# Patient Record
Sex: FEMALE | Race: WHITE | NOT HISPANIC OR LATINO
[De-identification: names, ages, dates, MRNs, and addresses within clinical notes are randomized per-mention and may not be internally consistent; named-entity substitution may affect disease eponyms.]

---

## 2020-02-03 ENCOUNTER — RESULT REVIEW (OUTPATIENT)
Age: 40
End: 2020-02-03

## 2020-02-13 ENCOUNTER — APPOINTMENT (OUTPATIENT)
Dept: ORTHOPEDIC SURGERY | Facility: CLINIC | Age: 40
End: 2020-02-13
Payer: COMMERCIAL

## 2020-02-13 VITALS
SYSTOLIC BLOOD PRESSURE: 110 MMHG | HEIGHT: 65 IN | BODY MASS INDEX: 34.66 KG/M2 | DIASTOLIC BLOOD PRESSURE: 75 MMHG | WEIGHT: 208 LBS | HEART RATE: 84 BPM

## 2020-02-13 DIAGNOSIS — M25.551 PAIN IN RIGHT HIP: ICD-10-CM

## 2020-02-13 DIAGNOSIS — Z78.9 OTHER SPECIFIED HEALTH STATUS: ICD-10-CM

## 2020-02-13 DIAGNOSIS — S33.5XXA SPRAIN OF LIGAMENTS OF LUMBAR SPINE, INITIAL ENCOUNTER: ICD-10-CM

## 2020-02-13 DIAGNOSIS — Z80.1 FAMILY HISTORY OF MALIGNANT NEOPLASM OF TRACHEA, BRONCHUS AND LUNG: ICD-10-CM

## 2020-02-13 PROCEDURE — 99204 OFFICE O/P NEW MOD 45 MIN: CPT

## 2020-02-13 PROCEDURE — 72100 X-RAY EXAM L-S SPINE 2/3 VWS: CPT

## 2020-02-13 PROCEDURE — 73502 X-RAY EXAM HIP UNI 2-3 VIEWS: CPT | Mod: RT

## 2020-03-16 ENCOUNTER — APPOINTMENT (OUTPATIENT)
Dept: ORTHOPEDIC SURGERY | Facility: CLINIC | Age: 40
End: 2020-03-16

## 2021-06-18 ENCOUNTER — TRANSCRIPTION ENCOUNTER (OUTPATIENT)
Age: 41
End: 2021-06-18

## 2021-10-05 ENCOUNTER — APPOINTMENT (OUTPATIENT)
Dept: BARIATRICS | Facility: CLINIC | Age: 41
End: 2021-10-05
Payer: COMMERCIAL

## 2021-10-05 VITALS
HEART RATE: 97 BPM | WEIGHT: 235 LBS | SYSTOLIC BLOOD PRESSURE: 110 MMHG | OXYGEN SATURATION: 99 % | HEIGHT: 63 IN | BODY MASS INDEX: 41.64 KG/M2 | DIASTOLIC BLOOD PRESSURE: 70 MMHG

## 2021-10-05 DIAGNOSIS — Z13.29 ENCOUNTER FOR SCREENING FOR OTHER SUSPECTED ENDOCRINE DISORDER: ICD-10-CM

## 2021-10-05 DIAGNOSIS — R06.02 SHORTNESS OF BREATH: ICD-10-CM

## 2021-10-05 DIAGNOSIS — Z13.228 ENCOUNTER FOR SCREENING FOR OTHER SUSPECTED ENDOCRINE DISORDER: ICD-10-CM

## 2021-10-05 DIAGNOSIS — Z13.0 ENCOUNTER FOR SCREENING FOR OTHER SUSPECTED ENDOCRINE DISORDER: ICD-10-CM

## 2021-10-05 DIAGNOSIS — M62.89 OTHER SPECIFIED DISORDERS OF MUSCLE: ICD-10-CM

## 2021-10-05 DIAGNOSIS — Z83.3 FAMILY HISTORY OF DIABETES MELLITUS: ICD-10-CM

## 2021-10-05 DIAGNOSIS — Z13.0 ENCOUNTER FOR SCREENING FOR DISEASES OF THE BLOOD AND BLOOD-FORMING ORGANS AND CERTAIN DISORDERS INVOLVING THE IMMUNE MECHANISM: ICD-10-CM

## 2021-10-05 PROCEDURE — 99205 OFFICE O/P NEW HI 60 MIN: CPT

## 2021-10-05 NOTE — REVIEW OF SYSTEMS
[Recent Change In Weight] : ~T recent weight change [Shortness Of Breath] : shortness of breath [Joint Stiffness] : joint stiffness [Negative] : Allergic/Immunologic

## 2021-10-11 LAB
25(OH)D3 SERPL-MCNC: 34.5 NG/ML
ALBUMIN SERPL ELPH-MCNC: 4.1 G/DL
ALP BLD-CCNC: 74 U/L
ALT SERPL-CCNC: 12 U/L
ANION GAP SERPL CALC-SCNC: 13 MMOL/L
AST SERPL-CCNC: 13 U/L
BASOPHILS # BLD AUTO: 0.03 K/UL
BASOPHILS NFR BLD AUTO: 0.5 %
BILIRUB SERPL-MCNC: 0.8 MG/DL
BUN SERPL-MCNC: 14 MG/DL
CALCIUM SERPL-MCNC: 8.9 MG/DL
CHLORIDE SERPL-SCNC: 106 MMOL/L
CHOLEST SERPL-MCNC: 142 MG/DL
CO2 SERPL-SCNC: 21 MMOL/L
CREAT SERPL-MCNC: 0.71 MG/DL
EOSINOPHIL # BLD AUTO: 0.07 K/UL
EOSINOPHIL NFR BLD AUTO: 1.1 %
ESTIMATED AVERAGE GLUCOSE: 100 MG/DL
FERRITIN SERPL-MCNC: 115 NG/ML
FOLATE SERPL-MCNC: 16.5 NG/ML
GLUCOSE SERPL-MCNC: 91 MG/DL
HBA1C MFR BLD HPLC: 5.1 %
HCT VFR BLD CALC: 43.2 %
HDLC SERPL-MCNC: 52 MG/DL
HGB BLD-MCNC: 14.6 G/DL
IMM GRANULOCYTES NFR BLD AUTO: 0.2 %
INR PPP: 1.12 RATIO
IRON SATN MFR SERPL: 44 %
IRON SERPL-MCNC: 122 UG/DL
LDLC SERPL CALC-MCNC: 77 MG/DL
LYMPHOCYTES # BLD AUTO: 2.33 K/UL
LYMPHOCYTES NFR BLD AUTO: 35.9 %
MAN DIFF?: NORMAL
MCHC RBC-ENTMCNC: 29.6 PG
MCHC RBC-ENTMCNC: 33.8 GM/DL
MCV RBC AUTO: 87.6 FL
MONOCYTES # BLD AUTO: 0.58 K/UL
MONOCYTES NFR BLD AUTO: 8.9 %
NEUTROPHILS # BLD AUTO: 3.47 K/UL
NEUTROPHILS NFR BLD AUTO: 53.4 %
NONHDLC SERPL-MCNC: 90 MG/DL
PLATELET # BLD AUTO: 260 K/UL
POTASSIUM SERPL-SCNC: 4.3 MMOL/L
PROT SERPL-MCNC: 6.7 G/DL
PT BLD: 13.2 SEC
RBC # BLD: 4.93 M/UL
RBC # FLD: 11.9 %
SODIUM SERPL-SCNC: 140 MMOL/L
TIBC SERPL-MCNC: 278 UG/DL
TRIGL SERPL-MCNC: 64 MG/DL
TSH SERPL-ACNC: 1.19 UIU/ML
UIBC SERPL-MCNC: 156 UG/DL
VIT B12 SERPL-MCNC: 588 PG/ML
WBC # FLD AUTO: 6.49 K/UL

## 2021-10-19 NOTE — CONSULT LETTER
[Consult Letter:] : I had the pleasure of evaluating your patient, [unfilled]. [Please see my note below.] : Please see my note below. [Consult Closing:] : Thank you very much for allowing me to participate in the care of this patient.  If you have any questions, please do not hesitate to contact me. [Sincerely,] : Sincerely, [FreeTextEntry3] : Breana Coronel MD, FACS

## 2021-10-19 NOTE — ASSESSMENT
[FreeTextEntry1] : 41 year old woman with long-standing history of morbid obesity presents today to discuss options for weight loss surgery.  I had an extensive discussion with the patient reviewing the Laparoscopic Sleeve Gastrectomy. Diagrams were used. All questions were answered.  \par \par Complications were discussed including but not limited to: vitamin and protein deficiencies, pneumonia, urinary infection, wound infection, leaks/peritonitis possibly requiring intraabdominal drains or reoperation, bleeding, DVT, pulmonary embolus, severe reflux, sleeve obstruction, abdominal wall hernias, revisions, death, inadequate weight loss. The importance of vitamins and protein supplementation was stressed, as was the importance of follow-up and exercise. \par \par Patient encouraged to make dietary and lifestyle changes in preparation for surgery.\par \par Patient with a long history of morbid obesity.She is interested in the Laparoscopic Sleeve Gastrectomy. She was given written material to review.  Pre-operative evaluations were reviewed. She will need to lose weight prior to surgery and will be seen again prior to surgery.She was told to call with any questions.

## 2021-10-19 NOTE — HISTORY OF PRESENT ILLNESS
[de-identified] : 41 year old woman with a long-standing history of morbid obesity, who has attempted numerous weight loss treatments without long term success. Patient is familiar with the Laparoscopic Adjustable Gastric Band, the Laparoscopic Sleeve Gastrectomy and the Laparoscopic Gastric Bypass. Patient presents today to discuss options for surgery, specifically the Laparoscopic Sleeve Gastrectomy.

## 2021-11-08 DIAGNOSIS — Z01.818 ENCOUNTER FOR OTHER PREPROCEDURAL EXAMINATION: ICD-10-CM

## 2021-11-12 LAB — SARS-COV-2 N GENE NPH QL NAA+PROBE: NOT DETECTED

## 2021-11-15 ENCOUNTER — APPOINTMENT (OUTPATIENT)
Dept: PULMONOLOGY | Facility: CLINIC | Age: 41
End: 2021-11-15
Payer: COMMERCIAL

## 2021-11-15 VITALS
HEART RATE: 79 BPM | OXYGEN SATURATION: 97 % | WEIGHT: 238 LBS | TEMPERATURE: 97 F | RESPIRATION RATE: 15 BRPM | HEIGHT: 64 IN | SYSTOLIC BLOOD PRESSURE: 125 MMHG | DIASTOLIC BLOOD PRESSURE: 84 MMHG | BODY MASS INDEX: 40.63 KG/M2

## 2021-11-15 VITALS — WEIGHT: 236 LBS | TEMPERATURE: 98.3 F | BODY MASS INDEX: 40.29 KG/M2 | HEART RATE: 90 BPM | HEIGHT: 64 IN

## 2021-11-15 PROCEDURE — ZZZZZ: CPT

## 2021-11-15 PROCEDURE — 99205 OFFICE O/P NEW HI 60 MIN: CPT | Mod: 25

## 2021-11-15 PROCEDURE — 94726 PLETHYSMOGRAPHY LUNG VOLUMES: CPT

## 2021-11-15 PROCEDURE — 94010 BREATHING CAPACITY TEST: CPT

## 2021-11-15 PROCEDURE — 94729 DIFFUSING CAPACITY: CPT

## 2021-11-15 NOTE — ASSESSMENT
[FreeTextEntry1] : 40 y/o F with PMH of presented to the clinic for an initial evaluation. She is in the process of undergoing preoperative bariatric surgery evaluation.  From a pulmonary standpoint, she is doing well.  Does not have significant dyspnea of ALBERTO.  She was recently prescribed a rescue inhaler in the Spring for respiratory symptoms but those have subsided and she does not use her inhaler.  No limitation from an exercise stand point.  Denies cough.  No history of asthma. On exam, she is saturating 99% on room air with no e/o desaturation with exertion.  Her PFTs are witin normal limits. Will obtain a CXR.  From a sleep standpoint, she is doing well.  Has a child at home who is 5 years old so insufficient sleep time is an issue on occasion.  She snores but otherwise denies frequent awakenings unless it is related to her child waking up.  Feels mostly refreshed in the AM and minimal EDS with an ESS of 2 on today's visit.  On exam, her oropharynx is crowded due to enlarged base of tongue and low lying soft palate -- all of which increase risk of LACI.   The patient was referred to the [facility] for diagnostic polysomnography for further assessment. The ramifications of obstructive sleep apnea and its potential therapeutic modalities were discussed with the patient. The dangers of drowsy driving were discussed with the patient.  The patient was warned to avoid drowsy driving. The patient will followup after results of this study are available.\par  \par Jose Bowser, DO\par Pulmonary, Critical Care and Sleep Medicine Attending

## 2021-11-15 NOTE — PHYSICAL EXAM
[No Acute Distress] : no acute distress [Low Lying Soft Palate] : low lying soft palate [II] : Mallampati Class: II [Normal Appearance] : normal appearance [Normal Rate/Rhythm] : normal rate/rhythm [Normal S1, S2] : normal s1, s2 [No Murmurs] : no murmurs [No Resp Distress] : no resp distress [Clear to Auscultation Bilaterally] : clear to auscultation bilaterally [Benign] : benign [Normal Gait] : normal gait [No Edema] : no edema [Oriented x3] : oriented x3 [Normal Affect] : normal affect

## 2021-11-15 NOTE — HISTORY OF PRESENT ILLNESS
[TextBox_4] : 42 y/o F with no significant PMH presented to the clinic for an initial evaluation.\par \par SHe is in the process of undergoing preoperative bariatric surgery evaluation.  \par \par From a pulmonary standpoint, she is doing well.  Does not have significant dyspnea of ALBERTO.  She was recently prescribed a rescue inhaler in the Spring for respiratory symptoms but those have subsided and she does not use her inhaler.  No limitation from an exercise stand point.  Denies cough.  No history of asthma.\par \par From a sleep standpoint, she is doing well.  Has a child at home who is 5 years old so insufficient sleep time is an issue on occasion.  She snores but otherwise denies frequent awakenings unless it is related to her child waking up.  Feels mostly refreshed in the AM and minimal EDS with an ESS of 2 on today's visit.  \par \par \par ____________________________________________________________________\par EPWORTH SLEEPINESS SCALE\par \par How likely are you to doze off or fall asleep in the situations described below, in contrast to feeling just tired?  \par This refers to your usual way of life in recent times.  \par Even if you haven't done some of these things recently, try to work out how they would have affected you.\par Use the following scale to choose one most appropriate number for each situation.\par \par Chance of dozing.............Situation\par ...............0.........................Sitting and reading\par ...............1.........................Watching TV\par ...............0.........................Sitting inactive in a public place (eg a theatre or a meeting)\par ...............0.........................As a passenger in a car for an hour without a break\par ...............1.........................Lying down to rest in the afternoon when circumstances permit\par ...............0.........................Sitting and talking to someone\par ...............0.........................Sitting quietly after lunch without alcohol\par ...............0.........................In a car, while stopped for a few minutes in traffic\par \par ..............2..........................TOTAL SCORE\par \par 0 = Never would doze\par 1 = Slight chance of dozing\par 2 = Moderate chance of dozing\par 3 = High chance of dozing \par ______________________________________________________________________

## 2021-11-28 ENCOUNTER — OUTPATIENT (OUTPATIENT)
Dept: OUTPATIENT SERVICES | Facility: HOSPITAL | Age: 41
LOS: 1 days | End: 2021-11-28
Payer: COMMERCIAL

## 2021-11-28 ENCOUNTER — APPOINTMENT (OUTPATIENT)
Dept: RADIOLOGY | Facility: IMAGING CENTER | Age: 41
End: 2021-11-28
Payer: COMMERCIAL

## 2021-11-28 DIAGNOSIS — Z01.818 ENCOUNTER FOR OTHER PREPROCEDURAL EXAMINATION: ICD-10-CM

## 2021-11-28 PROCEDURE — 71046 X-RAY EXAM CHEST 2 VIEWS: CPT | Mod: 26

## 2021-11-28 PROCEDURE — 71046 X-RAY EXAM CHEST 2 VIEWS: CPT

## 2021-11-29 ENCOUNTER — APPOINTMENT (OUTPATIENT)
Dept: GASTROENTEROLOGY | Facility: CLINIC | Age: 41
End: 2021-11-29

## 2021-12-01 ENCOUNTER — APPOINTMENT (OUTPATIENT)
Dept: BARIATRICS/WEIGHT MGMT | Facility: CLINIC | Age: 41
End: 2021-12-01
Payer: COMMERCIAL

## 2021-12-01 VITALS — BODY MASS INDEX: 40.12 KG/M2 | WEIGHT: 235 LBS | HEIGHT: 64 IN

## 2021-12-01 DIAGNOSIS — Z78.9 OTHER SPECIFIED HEALTH STATUS: ICD-10-CM

## 2021-12-01 PROCEDURE — 90791 PSYCH DIAGNOSTIC EVALUATION: CPT | Mod: 95

## 2021-12-14 ENCOUNTER — NON-APPOINTMENT (OUTPATIENT)
Age: 41
End: 2021-12-14

## 2021-12-17 ENCOUNTER — APPOINTMENT (OUTPATIENT)
Dept: BARIATRICS/WEIGHT MGMT | Facility: CLINIC | Age: 41
End: 2021-12-17
Payer: COMMERCIAL

## 2021-12-17 VITALS — BODY MASS INDEX: 41.64 KG/M2 | WEIGHT: 235 LBS | HEIGHT: 63 IN

## 2021-12-17 PROCEDURE — 97802 MEDICAL NUTRITION INDIV IN: CPT | Mod: 95

## 2021-12-20 ENCOUNTER — APPOINTMENT (OUTPATIENT)
Dept: BARIATRICS | Facility: CLINIC | Age: 41
End: 2021-12-20
Payer: COMMERCIAL

## 2021-12-20 VITALS
TEMPERATURE: 96.8 F | HEIGHT: 63 IN | WEIGHT: 236.55 LBS | HEART RATE: 84 BPM | BODY MASS INDEX: 41.91 KG/M2 | OXYGEN SATURATION: 98 % | DIASTOLIC BLOOD PRESSURE: 74 MMHG | SYSTOLIC BLOOD PRESSURE: 126 MMHG

## 2021-12-20 DIAGNOSIS — Z87.19 PERSONAL HISTORY OF OTHER DISEASES OF THE DIGESTIVE SYSTEM: ICD-10-CM

## 2021-12-20 DIAGNOSIS — R63.5 ABNORMAL WEIGHT GAIN: ICD-10-CM

## 2021-12-20 PROCEDURE — 99214 OFFICE O/P EST MOD 30 MIN: CPT

## 2021-12-27 NOTE — REVIEW OF SYSTEMS
[Recent Change In Weight] : ~T recent weight change [Dysphagia] : dysphagia [Reflux/Heartburn] : reflux/heartburn [Negative] : Endocrine [Eye Pain] : no eye pain [Chest Pain] : no chest pain [Shortness Of Breath] : no shortness of breath [Cough] : no cough [SOB on Exertion] : no shortness of breath during exertion [Abdominal Pain] : no abdominal pain [Vomiting] : no vomiting [Constipation] : no constipation [Diarrhea] : no diarrhea [FreeTextEntry2] : weight gain  [FreeTextEntry4] : hx of occasional dysphagia.

## 2021-12-27 NOTE — HISTORY OF PRESENT ILLNESS
[de-identified] : 41 year old F undergoing workup for laparoscopic sleeve gastrectomy here for a follow up pre op visit. Current weight is 236 lbs.  Patient is currently in the process of completing her workup as well as a medically supervised diet. Patient continues to make efforts to improve food choices and increased activity.Pt is following a protein focused diet - 3 meals a day - consuming a sufficient amount of zero calorie liquid.  Patient knows she needs to lose weight prior to surgery. Plan to increase daily exercise incorporating cardio and strength training. All questions were answered. Discussed and reviewed further requirements needed prior to scheduling surgery. \par

## 2021-12-27 NOTE — PHYSICAL EXAM
[Obese] : obese [Normal] : affect appropriate [de-identified] : EOMI , Anicteric  [de-identified] : obese soft non tender  / no erythema no swelling noted  no evidence of hernia

## 2021-12-27 NOTE — ASSESSMENT
[FreeTextEntry1] : 41 year old F undergoing workup for laparoscopic sleeve gastrectomy here for a follow up pre op visit. Current weight is 236 lbs.  Weight stable  since last visit.  Patient is currently in the process of completing her workup as well as a medically supervised diet.Hx of GERD symptoms / occasional dysphagia - currently taking a daily PPI. \par \par Pre op education classes completed. \par Nutrition one on one completed Psych completed \par \par \par GI consult and subsequent Upper EGD completed- pathology report - normal however pt was advised to follow up with another GI for advanced testing  due complaints of occasional dysphagia and continued GERD symptoms- including swallow study/ possible Esophageal Manometry testing . \par Pt was given referral to see Dr. Richard Sheikh - will call today to schedule consult.\par \par Sleep Study scheduled on 1/10/2022. \par \par Needs letter of support/ diet history / routine labs - completed. \par \par Appointments  and testing with cardiology /pulmonary scheduled.\par \par \par Nutritional counseling has been provided. The patient is encouraged to remain calorie conscious and continue a low fat, low carbohydrate, protein focus diet. Pt encouraged to participate in a daily exercise regimen incorporating cardio and strength training. \par \par Return to office after completion of all requirements and testing needed prior to visit. \par \par

## 2022-01-10 ENCOUNTER — APPOINTMENT (OUTPATIENT)
Dept: SLEEP CENTER | Facility: CLINIC | Age: 42
End: 2022-01-10
Payer: COMMERCIAL

## 2022-01-10 ENCOUNTER — OUTPATIENT (OUTPATIENT)
Dept: OUTPATIENT SERVICES | Facility: HOSPITAL | Age: 42
LOS: 1 days | End: 2022-01-10
Payer: COMMERCIAL

## 2022-01-10 PROCEDURE — 95806 SLEEP STUDY UNATT&RESP EFFT: CPT | Mod: 26

## 2022-01-10 PROCEDURE — 95806 SLEEP STUDY UNATT&RESP EFFT: CPT

## 2022-01-12 ENCOUNTER — APPOINTMENT (OUTPATIENT)
Dept: GASTROENTEROLOGY | Facility: CLINIC | Age: 42
End: 2022-01-12
Payer: COMMERCIAL

## 2022-01-12 VITALS
TEMPERATURE: 97.7 F | HEART RATE: 86 BPM | WEIGHT: 236 LBS | DIASTOLIC BLOOD PRESSURE: 72 MMHG | BODY MASS INDEX: 41.82 KG/M2 | SYSTOLIC BLOOD PRESSURE: 125 MMHG | OXYGEN SATURATION: 98 % | HEIGHT: 63 IN

## 2022-01-12 DIAGNOSIS — R13.19 OTHER DYSPHAGIA: ICD-10-CM

## 2022-01-12 DIAGNOSIS — G47.33 OBSTRUCTIVE SLEEP APNEA (ADULT) (PEDIATRIC): ICD-10-CM

## 2022-01-12 PROCEDURE — 99204 OFFICE O/P NEW MOD 45 MIN: CPT

## 2022-01-12 NOTE — PHYSICAL EXAM
[General Appearance - Alert] : alert [General Appearance - In No Acute Distress] : in no acute distress [Sclera] : the sclera and conjunctiva were normal [Extraocular Movements] : extraocular movements were intact [Neck Appearance] : the appearance of the neck was normal [Neck Cervical Mass (___cm)] : no neck mass was observed [Heart Rate And Rhythm] : heart rate was normal and rhythm regular [Heart Sounds] : normal S1 and S2 [Abdomen Soft] : soft [Abdomen Tenderness] : non-tender [Oriented To Time, Place, And Person] : oriented to person, place, and time [Impaired Insight] : insight and judgment were intact

## 2022-01-12 NOTE — HISTORY OF PRESENT ILLNESS
[de-identified] : 41F referred by [facility] for further w/u of dysphagia and GERD. Over the last month has had two episodes of dysphagia to solids. She was eating a burger and a chicken wrap during the two respective episodes, felt that food was stuck in her chest. Tried to drink water but it would not pass. Relief only after self induced vomiting. No further episodes and no previous similar symptoms. She is back to now eating a regular diet without any issues. No current problems with solids or liquids, no odynophagia. She does report intermittent heartburn and reflux, worse w/ alcohol and tomato sauce. \par \par She initially went to see GI Dr. De Los Santos. \par Started on prn PPI which she feels is also helping her heartburn. \par s/p EGD in 11/2021 - esophagitis at GE junction, otherwise normal exam. \par Path - mild chronic gastritis, no H pylori\par

## 2022-01-12 NOTE — ASSESSMENT
[FreeTextEntry1] : 41F referred by bariatric surgery for further w/u of dysphagia and GERD. Two episodes of dysphagia to solids over the last month (burger, chicken wrap), no issues w/ liquids. Both episodes required self induced vomiting for resolution. Unable to drink water at the time. No prior similar symptoms. Currently eating normal diet w/o dysphagia, odynophagia. +intermittent mild heartburn, worse w/ alcohol or tomatoes. Follows w GI Dr De Los Santos, here now for further evaluation of dysphagia. \par \par - s/p EGD w/ Dr De Los Santos in 11/2021 - esophagitis at GE junction, otherwise normal exam. Path - mild chronic gastritis, no H pylori\par - I called and spoke to Dr De Los Santos and pathologist Dr Hall, no eosinophils noted on either mid or distal biopsy. \par - switch from prn PPI to course of daily PPI \par - Lifestyle modifications for GERD \par - schedule manometry testing for dysphagia to r/o underlying motility issue\par - continued follow up w/ bariatric surgery \par - RTC after motility testing

## 2022-01-18 ENCOUNTER — NON-APPOINTMENT (OUTPATIENT)
Age: 42
End: 2022-01-18

## 2022-01-27 ENCOUNTER — NON-APPOINTMENT (OUTPATIENT)
Age: 42
End: 2022-01-27

## 2022-01-27 ENCOUNTER — APPOINTMENT (OUTPATIENT)
Dept: PULMONOLOGY | Facility: CLINIC | Age: 42
End: 2022-01-27
Payer: COMMERCIAL

## 2022-01-27 DIAGNOSIS — R06.83 SNORING: ICD-10-CM

## 2022-01-27 PROCEDURE — 99213 OFFICE O/P EST LOW 20 MIN: CPT | Mod: 95

## 2022-01-27 NOTE — REVIEW OF SYSTEMS
[Obesity] : obesity [Negative] : Psychiatric [Fatigue] : no fatigue [Snoring] : no snoring [Chest Pain] : no chest pain [Anemia] : no anemia [A.M. Headache] : no headache present upon awakening [Heartburn] : no heartburn [Nocturia] : no nocturia [Witnessed Apneas] : demonstrated no ~M apnea [Frequent Nocturnal Awakenings] : no nocturnal awakenings from sleep [Difficulty Initiating Sleep] : no difficulty falling asleep [Difficulty Maintaining Sleep] : no difficulty maintaining sleep [Lower Extremity Discomfort] : no lower extremity discomfort [Hypersomnolence] : not sleeping much more than usual

## 2022-01-27 NOTE — HISTORY OF PRESENT ILLNESS
[Snoring] : snoring [Date: ___] : Date of most recent diagnostic polysomnogram: [unfilled] [AHI: ___ per hour] : Apnea-hypopnea index:  [unfilled] per hour [T90%: ___] : T90%: [unfilled]% [FreeTextEntry1] : 40 y/o F with no significant PMH logged into her telehealth encounter as a follow up visit. \par \par She was first seen on 11/15/2021 as a bariatric surgery evaluation.  She was sent for an HSAT on 01/10/2022 which showed an SUMAN of 0.8/hr with a T-90% of 0.0%.  She has not had any change to her quality of sleep and denies EDS.  \par \par She has not had any changes to her medical history or medications since last office encounter.

## 2022-01-27 NOTE — ASSESSMENT
[FreeTextEntry1] : 42 y/o F with no significant PMH logged into her telehealth encounter as a follow up visit.  HSAT on 01/10/2022 which showed an SUMAN of 0.8/hr with a T-90% of 0.0%.  She has not had any change to her quality of sleep and denies EDS.  She does not warrant any further work up at this time and is medically optimized to proceed with bariatric surgery.\par \par The dangers of drowsy driving were discussed with the patient.  The patient was warned to avoid drowsy driving. \par \par Jose Bowser, DO\par Pulmonary, Critical Care and Sleep Medicine Attending

## 2022-01-27 NOTE — REASON FOR VISIT
[Home] : at home, [unfilled] , at the time of the visit. [Medical Office: ([facility])___] : at the medical office located in  [Verbal consent obtained from patient] : the patient, [unfilled]

## 2022-01-31 ENCOUNTER — NON-APPOINTMENT (OUTPATIENT)
Age: 42
End: 2022-01-31

## 2022-02-04 ENCOUNTER — APPOINTMENT (OUTPATIENT)
Dept: GASTROENTEROLOGY | Facility: HOSPITAL | Age: 42
End: 2022-02-04

## 2022-02-04 ENCOUNTER — OUTPATIENT (OUTPATIENT)
Dept: OUTPATIENT SERVICES | Facility: HOSPITAL | Age: 42
LOS: 1 days | Discharge: HOME | End: 2022-02-04
Payer: COMMERCIAL

## 2022-02-04 VITALS
RESPIRATION RATE: 16 BRPM | HEART RATE: 90 BPM | TEMPERATURE: 98 F | OXYGEN SATURATION: 100 % | SYSTOLIC BLOOD PRESSURE: 122 MMHG | DIASTOLIC BLOOD PRESSURE: 86 MMHG

## 2022-02-04 DIAGNOSIS — R13.19 OTHER DYSPHAGIA: ICD-10-CM

## 2022-02-04 PROCEDURE — 91010 ESOPHAGUS MOTILITY STUDY: CPT | Mod: 26,GC

## 2022-02-04 PROCEDURE — 91037 ESOPH IMPED FUNCTION TEST: CPT | Mod: 26,GC

## 2022-02-04 NOTE — ASU PATIENT PROFILE, ADULT - FALL HARM RISK - UNIVERSAL INTERVENTIONS
Bed in lowest position, wheels locked, appropriate side rails in place/Call bell, personal items and telephone in reach/Instruct patient to call for assistance before getting out of bed or chair/Non-slip footwear when patient is out of bed/[place] to call system/Physically safe environment - no spills, clutter or unnecessary equipment/Purposeful Proactive Rounding/Room/bathroom lighting operational, light cord in reach

## 2022-02-07 ENCOUNTER — NON-APPOINTMENT (OUTPATIENT)
Age: 42
End: 2022-02-07

## 2022-02-07 ENCOUNTER — APPOINTMENT (OUTPATIENT)
Dept: BARIATRICS | Facility: CLINIC | Age: 42
End: 2022-02-07
Payer: COMMERCIAL

## 2022-02-07 VITALS
WEIGHT: 234.79 LBS | SYSTOLIC BLOOD PRESSURE: 120 MMHG | TEMPERATURE: 97.5 F | BODY MASS INDEX: 41.6 KG/M2 | HEART RATE: 95 BPM | DIASTOLIC BLOOD PRESSURE: 74 MMHG | HEIGHT: 63 IN | OXYGEN SATURATION: 97 %

## 2022-02-07 PROCEDURE — 99213 OFFICE O/P EST LOW 20 MIN: CPT

## 2022-02-07 NOTE — PHYSICAL EXAM
Lesley Cabrera was just discharged from [facility] yesterday  Requesting to have a prescription for her coughing  She was taking a cough syrup in the hospital   Her discharge notes state that there will be a prescription for that, but there was nothing there  They have been trying to reach the discharge nurse but cannot get her  Was in for Bronchitis and high sugar, so it will have to be low sugar syrup  Uses [facility] has an appt next week for a BRITTANY with Wenceslao Sloan also needs a prescription for a new nebulizer machine  Fax to [facility] phone #  [phone] [Obese, well nourished, in no acute distress] : obese, well nourished, in no acute distress [Normal] : grossly intact [de-identified] : obese, soft, nontender, no evidence of hernia

## 2022-02-07 NOTE — HISTORY OF PRESENT ILLNESS
[de-identified] : 41 year old woman  with longstanding history of morbid obesity undergoing workup for laparoscopic sleeve gastrectomy presents today for preoperative visit. Patient lost/gained weight since last visit. Patient has completed her workup and is ready to be scheduled for surgery.   No history of PONV.  Occasional constipation treats with miralax. Reflux much improved with dietary changes.  Patient understands potential risk of developing postoperative reflux and wishes to proceed with Sleeve.  Motility study was normal, except small hiatal hernia.\par  \par \par

## 2022-02-07 NOTE — ASSESSMENT
[FreeTextEntry1] : 41 year old woman  with longstanding history of obesity completed workup for laparoscopic sleeve gastrectomy and is ready to be scheduled for laparoscopic sleeve gastrectomy. Patient was encouraged to lose weight prior to surgery. Patient will be on preoperative modified liquid diet for 2 weeks.  Nutrition and exercise guidelines were reviewed with the patient. Patient will attend preoperative education class. Procedure risks and benefits were again discussed with patient. All questions were answered.\par \par Schedule surgery date\par Two-week preoperative modified liquid diet\par PST and Medical clearance\par Preoperative education class\par Call if any questions or concerns.\par \par Time was spent before and after visit reviewing chart.\par

## 2022-02-09 LAB
VIT A SERPL-MCNC: 36.3 UG/DL
VIT B1 SERPL-MCNC: 107.7 NMOL/L
VIT B6 SERPL-MCNC: 6.5 UG/L

## 2022-02-17 ENCOUNTER — OUTPATIENT (OUTPATIENT)
Dept: OUTPATIENT SERVICES | Facility: HOSPITAL | Age: 42
LOS: 1 days | End: 2022-02-17
Payer: COMMERCIAL

## 2022-02-17 VITALS
WEIGHT: 229.06 LBS | OXYGEN SATURATION: 99 % | HEART RATE: 81 BPM | HEIGHT: 64 IN | SYSTOLIC BLOOD PRESSURE: 109 MMHG | RESPIRATION RATE: 15 BRPM | DIASTOLIC BLOOD PRESSURE: 76 MMHG | TEMPERATURE: 97 F

## 2022-02-17 DIAGNOSIS — Z98.891 HISTORY OF UTERINE SCAR FROM PREVIOUS SURGERY: Chronic | ICD-10-CM

## 2022-02-17 DIAGNOSIS — E66.9 OBESITY, UNSPECIFIED: ICD-10-CM

## 2022-02-17 DIAGNOSIS — Z87.59 PERSONAL HISTORY OF OTHER COMPLICATIONS OF PREGNANCY, CHILDBIRTH AND THE PUERPERIUM: Chronic | ICD-10-CM

## 2022-02-17 DIAGNOSIS — E66.01 MORBID (SEVERE) OBESITY DUE TO EXCESS CALORIES: ICD-10-CM

## 2022-02-17 DIAGNOSIS — Z01.818 ENCOUNTER FOR OTHER PREPROCEDURAL EXAMINATION: ICD-10-CM

## 2022-02-17 LAB
ALBUMIN SERPL ELPH-MCNC: 3.6 G/DL — SIGNIFICANT CHANGE UP (ref 3.3–5)
ALP SERPL-CCNC: 68 U/L — SIGNIFICANT CHANGE UP (ref 30–120)
ALT FLD-CCNC: 33 U/L DA — SIGNIFICANT CHANGE UP (ref 10–60)
ANION GAP SERPL CALC-SCNC: 8 MMOL/L — SIGNIFICANT CHANGE UP (ref 5–17)
AST SERPL-CCNC: 22 U/L — SIGNIFICANT CHANGE UP (ref 10–40)
BILIRUB SERPL-MCNC: 0.6 MG/DL — SIGNIFICANT CHANGE UP (ref 0.2–1.2)
BLD GP AB SCN SERPL QL: SIGNIFICANT CHANGE UP
BUN SERPL-MCNC: 14 MG/DL — SIGNIFICANT CHANGE UP (ref 7–23)
CALCIUM SERPL-MCNC: 8.4 MG/DL — SIGNIFICANT CHANGE UP (ref 8.4–10.5)
CHLORIDE SERPL-SCNC: 103 MMOL/L — SIGNIFICANT CHANGE UP (ref 96–108)
CO2 SERPL-SCNC: 28 MMOL/L — SIGNIFICANT CHANGE UP (ref 22–31)
CREAT SERPL-MCNC: 0.73 MG/DL — SIGNIFICANT CHANGE UP (ref 0.5–1.3)
GLUCOSE SERPL-MCNC: 96 MG/DL — SIGNIFICANT CHANGE UP (ref 70–99)
HCT VFR BLD CALC: 47.4 % — HIGH (ref 34.5–45)
HGB BLD-MCNC: 16 G/DL — HIGH (ref 11.5–15.5)
MCHC RBC-ENTMCNC: 29.4 PG — SIGNIFICANT CHANGE UP (ref 27–34)
MCHC RBC-ENTMCNC: 33.8 GM/DL — SIGNIFICANT CHANGE UP (ref 32–36)
MCV RBC AUTO: 87.1 FL — SIGNIFICANT CHANGE UP (ref 80–100)
NRBC # BLD: 0 /100 WBCS — SIGNIFICANT CHANGE UP (ref 0–0)
PLATELET # BLD AUTO: 282 K/UL — SIGNIFICANT CHANGE UP (ref 150–400)
POTASSIUM SERPL-MCNC: 4.3 MMOL/L — SIGNIFICANT CHANGE UP (ref 3.5–5.3)
POTASSIUM SERPL-SCNC: 4.3 MMOL/L — SIGNIFICANT CHANGE UP (ref 3.5–5.3)
PROT SERPL-MCNC: 7.5 G/DL — SIGNIFICANT CHANGE UP (ref 6–8.3)
RBC # BLD: 5.44 M/UL — HIGH (ref 3.8–5.2)
RBC # FLD: 11.9 % — SIGNIFICANT CHANGE UP (ref 10.3–14.5)
SODIUM SERPL-SCNC: 139 MMOL/L — SIGNIFICANT CHANGE UP (ref 135–145)
WBC # BLD: 6.09 K/UL — SIGNIFICANT CHANGE UP (ref 3.8–10.5)
WBC # FLD AUTO: 6.09 K/UL — SIGNIFICANT CHANGE UP (ref 3.8–10.5)

## 2022-02-17 PROCEDURE — G0463: CPT

## 2022-02-17 NOTE — H&P PST ADULT - PROBLEM SELECTOR PLAN 1
laparoscopic sleeve gastrectomy is planned for 3/8/2022  covid testing is scheduled for 3/6/2022 @ [facility]  Medical clearance requested; pre op instructions reviewed; best wishes offered.

## 2022-02-17 NOTE — H&P PST ADULT - NSICDXFAMILYHX_GEN_ALL_CORE_FT
FAMILY HISTORY:  Sibling  Still living? Yes, Estimated age: 31-40  Family history of seizures, Age at diagnosis: Age Unknown

## 2022-02-17 NOTE — H&P PST ADULT - HISTORY OF PRESENT ILLNESS
40 yo female is scheduled for laparoscopic sleeve gastrectomy with upper endoscopy on 3/8/2022 @ [facility].

## 2022-02-17 NOTE — H&P PST ADULT - NSANTHOBSERVEDRD_ENT_A_CORE
Patient informed of message below.  Patient verbalized understanding and had no further questions.      No

## 2022-02-17 NOTE — H&P PST ADULT - NSICDXPASTMEDICALHX_GEN_ALL_CORE_FT
PAST MEDICAL HISTORY:  Class 2 obesity without serious comorbidity with body mass index (BMI) of 39.0 to 39.9 in adult     COVID-19 vaccine series completed     GERD (gastroesophageal reflux disease)

## 2022-02-17 NOTE — H&P PST ADULT - NSANTHOSAYNRD_GEN_A_CORE
tested 2022 negative/No. LACI screening performed.  STOP BANG Legend: 0-2 = LOW Risk; 3-4 = INTERMEDIATE Risk; 5-8 = HIGH Risk

## 2022-02-18 RX ORDER — PANTOPRAZOLE SODIUM 20 MG/1
40 TABLET, DELAYED RELEASE ORAL DAILY
Refills: 0 | Status: DISCONTINUED | OUTPATIENT
Start: 2022-03-08 | End: 2022-03-09

## 2022-02-18 RX ORDER — ENOXAPARIN SODIUM 100 MG/ML
30 INJECTION SUBCUTANEOUS EVERY 12 HOURS
Refills: 0 | Status: DISCONTINUED | OUTPATIENT
Start: 2022-03-08 | End: 2022-03-09

## 2022-02-18 RX ORDER — ONDANSETRON 8 MG/1
4 TABLET, FILM COATED ORAL EVERY 6 HOURS
Refills: 0 | Status: DISCONTINUED | OUTPATIENT
Start: 2022-03-08 | End: 2022-03-09

## 2022-02-18 RX ORDER — SODIUM CHLORIDE 9 MG/ML
1000 INJECTION, SOLUTION INTRAVENOUS
Refills: 0 | Status: DISCONTINUED | OUTPATIENT
Start: 2022-03-08 | End: 2022-03-09

## 2022-02-18 RX ORDER — HYOSCYAMINE SULFATE 0.13 MG
0.12 TABLET ORAL EVERY 6 HOURS
Refills: 0 | Status: DISCONTINUED | OUTPATIENT
Start: 2022-03-08 | End: 2022-03-09

## 2022-02-18 RX ORDER — HYDROMORPHONE HYDROCHLORIDE 2 MG/ML
0.5 INJECTION INTRAMUSCULAR; INTRAVENOUS; SUBCUTANEOUS EVERY 4 HOURS
Refills: 0 | Status: DISCONTINUED | OUTPATIENT
Start: 2022-03-08 | End: 2022-03-09

## 2022-03-06 ENCOUNTER — OUTPATIENT (OUTPATIENT)
Dept: OUTPATIENT SERVICES | Facility: HOSPITAL | Age: 42
LOS: 1 days | End: 2022-03-06
Payer: COMMERCIAL

## 2022-03-06 DIAGNOSIS — Z87.59 PERSONAL HISTORY OF OTHER COMPLICATIONS OF PREGNANCY, CHILDBIRTH AND THE PUERPERIUM: Chronic | ICD-10-CM

## 2022-03-06 DIAGNOSIS — Z98.891 HISTORY OF UTERINE SCAR FROM PREVIOUS SURGERY: Chronic | ICD-10-CM

## 2022-03-06 DIAGNOSIS — Z20.828 CONTACT WITH AND (SUSPECTED) EXPOSURE TO OTHER VIRAL COMMUNICABLE DISEASES: ICD-10-CM

## 2022-03-06 LAB — SARS-COV-2 RNA SPEC QL NAA+PROBE: SIGNIFICANT CHANGE UP

## 2022-03-06 PROCEDURE — U0005: CPT

## 2022-03-06 PROCEDURE — U0003: CPT

## 2022-03-07 ENCOUNTER — TRANSCRIPTION ENCOUNTER (OUTPATIENT)
Age: 42
End: 2022-03-07

## 2022-03-07 NOTE — PATIENT PROFILE ADULT - FALL HARM RISK - RISK INTERVENTIONS

## 2022-03-07 NOTE — PATIENT PROFILE ADULT - FUNCTIONAL ASSESSMENT - DAILY ACTIVITY 4.
Anticipated Discharge Disposition:   Home with HH     Action:   Chart review complete.     Per PT note, HH recommended upon discharge. Per MD, anticipated discharge tomorrow.    1350: RN CM asked MD for HH order and face to face to be placed.    1430: RN CM met with patient at bedside to discuss HH choice. Patient is agreeable but wants time to look over HH form. RN VIRI explained the purpose of HH and distinguished that 3 choices would be preferred. Bedside RN made aware.     Barriers to Discharge:   HH choice  Medical Clearance    Plan:   Hospital care management will continue to assist with discharge planning needs.     Care Transition Team Assessment    Information Source  Orientation Level: Oriented X4  Information Given By: Other (Comments)  Who is responsible for making decisions for patient? : Patient    Readmission Evaluation  Is this a readmission?: No    Elopement Risk  Legal Hold: No  Ambulatory or Self Mobile in Wheelchair: Yes  Disoriented: No  Psychiatric Symptoms: None  History of Wandering: No  Elopement this Admit: No  Vocalizing Wanting to Leave: No  Displays Behaviors, Body Language Wanting to Leave: No-Not at Risk for Elopement  Elopement Risk: Not at Risk for Elopement    Interdisciplinary Discharge Planning  Does Admitting Nurse Feel This Could be a Complex Discharge?: No  Primary Care Physician: BING DAVID M.D.  Lives with - Patient's Self Care Capacity: Spouse  Patient or legal guardian wants to designate a caregiver: No  Support Systems: Spouse / Significant Other, Children  Housing / Facility: [address]  Do You Take your Prescribed Medications Regularly: Yes  Able to Return to Previous ADL's: Yes  Mobility Issues: No  Patient Prefers to be Discharged to:: home  Assistance Needed: Unknown at this Time    Discharge Preparedness  What is your plan after discharge?: Home with help  What are your discharge supports?: Spouse, Child    Finances  Financial Barriers to Discharge:  No  Prescription Coverage: Yes    Vision / Hearing Impairment  Vision Impairment : Yes  Right Eye Vision: Impaired, Wears Glasses  Left Eye Vision: Impaired, Wears Glasses  Hearing Impairment : Yes  Hearing Impairment: Both Ears, Hearing Device(s) Available  Does Pt Need Special Equipment for the Hearing Impaired?: No    Advance Directive  Advance Directive?: None    Domestic Abuse  Have you ever been the victim of abuse or violence?: No  Physical Abuse or Sexual Abuse: No  Verbal Abuse or Emotional Abuse: No  Possible Abuse/Neglect Reported to:: Not Applicable    Discharge Risks or Barriers  Discharge risks or barriers?: Complex medical needs  Patient risk factors: Vulnerable adult, Complex medical needs    Anticipated Discharge Information  Discharge Disposition: Still a Patient (30)         4 = No assist / stand by assistance

## 2022-03-08 ENCOUNTER — INPATIENT (INPATIENT)
Facility: HOSPITAL | Age: 42
LOS: 0 days | Discharge: HOME | DRG: 621 | End: 2022-03-09
Attending: SURGERY | Admitting: SURGERY
Payer: COMMERCIAL

## 2022-03-08 ENCOUNTER — RESULT REVIEW (OUTPATIENT)
Age: 42
End: 2022-03-08

## 2022-03-08 ENCOUNTER — APPOINTMENT (OUTPATIENT)
Dept: BARIATRICS | Facility: HOSPITAL | Age: 42
End: 2022-03-08
Payer: COMMERCIAL

## 2022-03-08 VITALS
OXYGEN SATURATION: 99 % | WEIGHT: 222.89 LBS | SYSTOLIC BLOOD PRESSURE: 119 MMHG | TEMPERATURE: 99 F | RESPIRATION RATE: 19 BRPM | HEIGHT: 63 IN | DIASTOLIC BLOOD PRESSURE: 59 MMHG | HEART RATE: 80 BPM

## 2022-03-08 DIAGNOSIS — Z87.59 PERSONAL HISTORY OF OTHER COMPLICATIONS OF PREGNANCY, CHILDBIRTH AND THE PUERPERIUM: Chronic | ICD-10-CM

## 2022-03-08 DIAGNOSIS — E66.01 MORBID (SEVERE) OBESITY DUE TO EXCESS CALORIES: ICD-10-CM

## 2022-03-08 DIAGNOSIS — Z98.891 HISTORY OF UTERINE SCAR FROM PREVIOUS SURGERY: Chronic | ICD-10-CM

## 2022-03-08 LAB
ABO RH CONFIRMATION: SIGNIFICANT CHANGE UP
HCG UR QL: NEGATIVE — SIGNIFICANT CHANGE UP

## 2022-03-08 PROCEDURE — 99221 1ST HOSP IP/OBS SF/LOW 40: CPT

## 2022-03-08 PROCEDURE — 88307 TISSUE EXAM BY PATHOLOGIST: CPT | Mod: 26

## 2022-03-08 PROCEDURE — 43775 LAP SLEEVE GASTRECTOMY: CPT | Mod: AS,22

## 2022-03-08 PROCEDURE — 43775 LAP SLEEVE GASTRECTOMY: CPT | Mod: 22

## 2022-03-08 DEVICE — STAPLER COVIDIEN TRI-STAPLE 60MM PURPLE INTELLIGENT RELOAD: Type: IMPLANTABLE DEVICE | Status: FUNCTIONAL

## 2022-03-08 DEVICE — SEALANT TISSEEL PRE FILLED FROZEN 4ML: Type: IMPLANTABLE DEVICE | Status: FUNCTIONAL

## 2022-03-08 DEVICE — STAPLER COVIDIEN TRI-STAPLE 60MM BLACK INTELLIGENT RELOAD: Type: IMPLANTABLE DEVICE | Status: FUNCTIONAL

## 2022-03-08 DEVICE — CLIP APPLIER ETHICON LIGAMAX 5MM: Type: IMPLANTABLE DEVICE | Status: FUNCTIONAL

## 2022-03-08 RX ORDER — IBUPROFEN 200 MG
800 TABLET ORAL EVERY 6 HOURS
Refills: 0 | Status: DISCONTINUED | OUTPATIENT
Start: 2022-03-08 | End: 2022-03-09

## 2022-03-08 RX ORDER — SODIUM CHLORIDE 9 MG/ML
1000 INJECTION, SOLUTION INTRAVENOUS
Refills: 0 | Status: DISCONTINUED | OUTPATIENT
Start: 2022-03-08 | End: 2022-03-08

## 2022-03-08 RX ORDER — APREPITANT 80 MG/1
40 CAPSULE ORAL ONCE
Refills: 0 | Status: COMPLETED | OUTPATIENT
Start: 2022-03-08 | End: 2022-03-08

## 2022-03-08 RX ORDER — SODIUM CHLORIDE 9 MG/ML
2000 INJECTION, SOLUTION INTRAVENOUS
Refills: 0 | Status: DISCONTINUED | OUTPATIENT
Start: 2022-03-08 | End: 2022-03-08

## 2022-03-08 RX ORDER — CEFAZOLIN SODIUM 1 G
2000 VIAL (EA) INJECTION ONCE
Refills: 0 | Status: COMPLETED | OUTPATIENT
Start: 2022-03-08 | End: 2022-03-08

## 2022-03-08 RX ORDER — ACETAMINOPHEN 500 MG
1000 TABLET ORAL EVERY 6 HOURS
Refills: 0 | Status: DISCONTINUED | OUTPATIENT
Start: 2022-03-09 | End: 2022-03-09

## 2022-03-08 RX ORDER — HYDROMORPHONE HYDROCHLORIDE 2 MG/ML
0.5 INJECTION INTRAMUSCULAR; INTRAVENOUS; SUBCUTANEOUS
Refills: 0 | Status: DISCONTINUED | OUTPATIENT
Start: 2022-03-08 | End: 2022-03-08

## 2022-03-08 RX ORDER — CHLORHEXIDINE GLUCONATE 213 G/1000ML
1 SOLUTION TOPICAL ONCE
Refills: 0 | Status: COMPLETED | OUTPATIENT
Start: 2022-03-08 | End: 2022-03-08

## 2022-03-08 RX ORDER — ACETAMINOPHEN 500 MG
1000 TABLET ORAL ONCE
Refills: 0 | Status: COMPLETED | OUTPATIENT
Start: 2022-03-08 | End: 2022-03-08

## 2022-03-08 RX ORDER — SODIUM CHLORIDE 9 MG/ML
1000 INJECTION, SOLUTION INTRAVENOUS ONCE
Refills: 0 | Status: COMPLETED | OUTPATIENT
Start: 2022-03-08 | End: 2022-03-08

## 2022-03-08 RX ORDER — ENOXAPARIN SODIUM 100 MG/ML
30 INJECTION SUBCUTANEOUS ONCE
Refills: 0 | Status: COMPLETED | OUTPATIENT
Start: 2022-03-08 | End: 2022-03-08

## 2022-03-08 RX ORDER — ACETAMINOPHEN 500 MG
1000 TABLET ORAL EVERY 6 HOURS
Refills: 0 | Status: COMPLETED | OUTPATIENT
Start: 2022-03-08 | End: 2022-03-09

## 2022-03-08 RX ORDER — ONDANSETRON 8 MG/1
4 TABLET, FILM COATED ORAL ONCE
Refills: 0 | Status: COMPLETED | OUTPATIENT
Start: 2022-03-08 | End: 2022-03-08

## 2022-03-08 RX ADMIN — Medication 400 MILLIGRAM(S): at 14:35

## 2022-03-08 RX ADMIN — ENOXAPARIN SODIUM 30 MILLIGRAM(S): 100 INJECTION SUBCUTANEOUS at 08:31

## 2022-03-08 RX ADMIN — SODIUM CHLORIDE 500 MILLILITER(S): 9 INJECTION, SOLUTION INTRAVENOUS at 18:44

## 2022-03-08 RX ADMIN — ONDANSETRON 4 MILLIGRAM(S): 8 TABLET, FILM COATED ORAL at 17:41

## 2022-03-08 RX ADMIN — CHLORHEXIDINE GLUCONATE 1 APPLICATION(S): 213 SOLUTION TOPICAL at 07:02

## 2022-03-08 RX ADMIN — SODIUM CHLORIDE 150 MILLILITER(S): 9 INJECTION, SOLUTION INTRAVENOUS at 20:51

## 2022-03-08 RX ADMIN — HYDROMORPHONE HYDROCHLORIDE 0.5 MILLIGRAM(S): 2 INJECTION INTRAMUSCULAR; INTRAVENOUS; SUBCUTANEOUS at 15:45

## 2022-03-08 RX ADMIN — ONDANSETRON 4 MILLIGRAM(S): 8 TABLET, FILM COATED ORAL at 23:38

## 2022-03-08 RX ADMIN — ONDANSETRON 4 MILLIGRAM(S): 8 TABLET, FILM COATED ORAL at 12:03

## 2022-03-08 RX ADMIN — SODIUM CHLORIDE 75 MILLILITER(S): 9 INJECTION, SOLUTION INTRAVENOUS at 12:03

## 2022-03-08 RX ADMIN — SODIUM CHLORIDE 1000 MILLILITER(S): 9 INJECTION, SOLUTION INTRAVENOUS at 07:02

## 2022-03-08 RX ADMIN — Medication 400 MILLIGRAM(S): at 20:51

## 2022-03-08 RX ADMIN — ENOXAPARIN SODIUM 30 MILLIGRAM(S): 100 INJECTION SUBCUTANEOUS at 20:51

## 2022-03-08 RX ADMIN — Medication 1000 MILLIGRAM(S): at 16:08

## 2022-03-08 RX ADMIN — APREPITANT 40 MILLIGRAM(S): 80 CAPSULE ORAL at 07:02

## 2022-03-08 RX ADMIN — HYDROMORPHONE HYDROCHLORIDE 0.5 MILLIGRAM(S): 2 INJECTION INTRAMUSCULAR; INTRAVENOUS; SUBCUTANEOUS at 16:15

## 2022-03-08 RX ADMIN — Medication 0.12 MILLIGRAM(S): at 13:05

## 2022-03-08 NOTE — BRIEF OPERATIVE NOTE - NSICDXBRIEFPROCEDURE_GEN_ALL_CORE_FT
PROCEDURES:  Laparoscopic sleeve gastrectomy with laparoscopic repair of hiatal hernia 08-Mar-2022 12:36:23  Katie Castaneda  EGD, intraoperative 08-Mar-2022 12:36:31  Katie Castaneda

## 2022-03-08 NOTE — BRIEF OPERATIVE NOTE - NSICDXBRIEFPOSTOP_GEN_ALL_CORE_FT
POST-OP DIAGNOSIS:  Morbid obesity due to excess calories 08-Mar-2022 12:36:52  Katie Castaneda  Hiatal hernia 08-Mar-2022 12:37:46  Katie Castaneda

## 2022-03-08 NOTE — CONSULT NOTE ADULT - ASSESSMENT
S/P sleeve gastrectomy     Aftercare following surgery  -Early ambulation  -Pain management  -Incentive Spirometry  -DVT ppx as per primary team    GERD  PPI      Thank you for allowing us to participate in the care of this patient. Our team will continue to follow along with you. Further recommendations to follow pending the clinical course.

## 2022-03-08 NOTE — CONSULT NOTE ADULT - SUBJECTIVE AND OBJECTIVE BOX
Patient is a 41y old  Female who presents with a chief complaint of     FROM ADMISSION H+P:   HPI: 40 yo female s/p laparoscopic sleeve gastrectomy with upper endoscopy. Patient seen post op. Tolerated procedure well without complications. C/o mild pain at surgical site. Denies any new complaints.      ----  PAST MEDICAL & SURGICAL HISTORY:  COVID-19 vaccine series completed    GERD (gastroesophageal reflux disease)    Class 2 obesity without serious comorbidity with body mass index (BMI) of 39.0 to 39.9 in adult    History of  section  2016    History of ectopic pregnancy          ----  Home Medications:  omeprazole 40 mg oral delayed release capsule: 1 cap(s) orally once a day (08 Mar 2022 06:58)    ----  FAMILY HISTORY:  Family history of seizures (Sibling)        ----  Allergies    No Known Allergies    Intolerances        ----  Social History:  Denies current alcohol, tobacco or drug use     ----  REVIEW OF SYSTEMS:  CONSTITUTIONAL: denies fever, chills, fatigue, weakness  HEENT: denies blurred vision, sore throat  SKIN: denies new lesions, rash  CARDIOVASCULAR: denies chest pain, chest pressure, palpitations  RESPIRATORY: denies shortness of breath, sputum production  GASTROINTESTINAL: denies nausea, vomiting, diarrhea, abdominal pain  GENITOURINARY: denies dysuria, discharge  NEUROLOGICAL: denies numbness, headache, focal weakness  MUSCULOSKELETAL: denies new joint pain, muscle aches  HEMATOLOGIC: denies gross bleeding, bruising    ----  PHYSICAL EXAM:  GENERAL: patient appears well, no acute distress, appropriately interactive  EYES: sclera clear, no exudates  LUNGS: good air entry bilaterally, clear to auscultation, symmetric breath sounds  HEART: soft S1/S2, regular rate and rhythm, no murmurs noted, no noted edema to bilateral lower extremities  GASTROINTESTINAL: abdomen is soft, nontender, nondistended, normoactive bowel sounds, no palpable masses  INTEGUMENT: good skin turgor, appropriate for ethnicity, appears well perfused, no jaundice noted  MUSCULOSKELETAL: no clubbing or cyanosis, no obvious deformity  NEUROLOGIC: awake, alert, oriented x3, good muscle tone in 4 extremities, no obvious sensory deficits  PSYCHIATRIC: mood is good, affect is congruent with mood, linear and logical thought process    T(C): 36.5 (22 @ 11:15), Max: 37.3 (22 @ 06:58)  HR: 94 (22 @ 11:45) (71 - 94)  BP: 133/72 (22 @ 11:45) (119/59 - 133/75)  RR: 18 (22 @ 11:45) (16 - 21)  SpO2: 100% (22 @ 11:45) (99% - 100%)  Wt(kg): --    ----  INPATIENT MEDICATIONS  HYDROmorphone  Injectable 0.5 milliGRAM(s) IV Push every 10 minutes PRN  lactated ringers. 1000 milliLiter(s) IV Continuous <Continuous>      ----  I&O's Summary      LABS:              CAPILLARY BLOOD GLUCOSE                    ----  Personally reviewed:  Vital sign trends: [ x ] yes    [  ] no     [  ] n/a  Laboratory results: [x  ] yes    [  ] no     [  ] n/a

## 2022-03-09 ENCOUNTER — TRANSCRIPTION ENCOUNTER (OUTPATIENT)
Age: 42
End: 2022-03-09

## 2022-03-09 VITALS
OXYGEN SATURATION: 98 % | SYSTOLIC BLOOD PRESSURE: 140 MMHG | DIASTOLIC BLOOD PRESSURE: 80 MMHG | TEMPERATURE: 98 F | HEART RATE: 67 BPM | RESPIRATION RATE: 16 BRPM

## 2022-03-09 LAB
ANION GAP SERPL CALC-SCNC: 10 MMOL/L — SIGNIFICANT CHANGE UP (ref 5–17)
BUN SERPL-MCNC: 5 MG/DL — LOW (ref 7–23)
CALCIUM SERPL-MCNC: 8.2 MG/DL — LOW (ref 8.4–10.5)
CHLORIDE SERPL-SCNC: 102 MMOL/L — SIGNIFICANT CHANGE UP (ref 96–108)
CO2 SERPL-SCNC: 24 MMOL/L — SIGNIFICANT CHANGE UP (ref 22–31)
CREAT SERPL-MCNC: 0.68 MG/DL — SIGNIFICANT CHANGE UP (ref 0.5–1.3)
EGFR: 113 ML/MIN/1.73M2 — SIGNIFICANT CHANGE UP
GLUCOSE SERPL-MCNC: 92 MG/DL — SIGNIFICANT CHANGE UP (ref 70–99)
HCT VFR BLD CALC: 41.4 % — SIGNIFICANT CHANGE UP (ref 34.5–45)
HGB BLD-MCNC: 13.9 G/DL — SIGNIFICANT CHANGE UP (ref 11.5–15.5)
MCHC RBC-ENTMCNC: 29.3 PG — SIGNIFICANT CHANGE UP (ref 27–34)
MCHC RBC-ENTMCNC: 33.6 GM/DL — SIGNIFICANT CHANGE UP (ref 32–36)
MCV RBC AUTO: 87.2 FL — SIGNIFICANT CHANGE UP (ref 80–100)
NRBC # BLD: 0 /100 WBCS — SIGNIFICANT CHANGE UP (ref 0–0)
PLATELET # BLD AUTO: 247 K/UL — SIGNIFICANT CHANGE UP (ref 150–400)
POTASSIUM SERPL-MCNC: 3.9 MMOL/L — SIGNIFICANT CHANGE UP (ref 3.5–5.3)
POTASSIUM SERPL-SCNC: 3.9 MMOL/L — SIGNIFICANT CHANGE UP (ref 3.5–5.3)
RBC # BLD: 4.75 M/UL — SIGNIFICANT CHANGE UP (ref 3.8–5.2)
RBC # FLD: 12.3 % — SIGNIFICANT CHANGE UP (ref 10.3–14.5)
SODIUM SERPL-SCNC: 136 MMOL/L — SIGNIFICANT CHANGE UP (ref 135–145)
WBC # BLD: 12.92 K/UL — HIGH (ref 3.8–10.5)
WBC # FLD AUTO: 12.92 K/UL — HIGH (ref 3.8–10.5)

## 2022-03-09 PROCEDURE — 99232 SBSQ HOSP IP/OBS MODERATE 35: CPT

## 2022-03-09 PROCEDURE — 80048 BASIC METABOLIC PNL TOTAL CA: CPT

## 2022-03-09 PROCEDURE — 85027 COMPLETE CBC AUTOMATED: CPT

## 2022-03-09 PROCEDURE — 81025 URINE PREGNANCY TEST: CPT

## 2022-03-09 PROCEDURE — 88307 TISSUE EXAM BY PATHOLOGIST: CPT

## 2022-03-09 PROCEDURE — C9399: CPT

## 2022-03-09 PROCEDURE — 36415 COLL VENOUS BLD VENIPUNCTURE: CPT

## 2022-03-09 PROCEDURE — C1889: CPT

## 2022-03-09 RX ORDER — OMEPRAZOLE 10 MG/1
1 CAPSULE, DELAYED RELEASE ORAL
Qty: 0 | Refills: 0 | DISCHARGE

## 2022-03-09 RX ORDER — ONDANSETRON 8 MG/1
1 TABLET, FILM COATED ORAL
Qty: 0 | Refills: 0 | DISCHARGE

## 2022-03-09 RX ORDER — OXYCODONE HYDROCHLORIDE 5 MG/1
1 TABLET ORAL
Qty: 0 | Refills: 0 | DISCHARGE

## 2022-03-09 RX ORDER — ACETAMINOPHEN 500 MG
0 TABLET ORAL
Qty: 0 | Refills: 0 | DISCHARGE

## 2022-03-09 RX ADMIN — PANTOPRAZOLE SODIUM 40 MILLIGRAM(S): 20 TABLET, DELAYED RELEASE ORAL at 12:48

## 2022-03-09 RX ADMIN — Medication 400 MILLIGRAM(S): at 07:58

## 2022-03-09 RX ADMIN — Medication 400 MILLIGRAM(S): at 16:05

## 2022-03-09 RX ADMIN — Medication 800 MILLIGRAM(S): at 08:37

## 2022-03-09 RX ADMIN — ONDANSETRON 4 MILLIGRAM(S): 8 TABLET, FILM COATED ORAL at 06:16

## 2022-03-09 RX ADMIN — ONDANSETRON 4 MILLIGRAM(S): 8 TABLET, FILM COATED ORAL at 12:48

## 2022-03-09 RX ADMIN — Medication 1000 MILLIGRAM(S): at 16:06

## 2022-03-09 RX ADMIN — Medication 400 MILLIGRAM(S): at 02:49

## 2022-03-09 RX ADMIN — ENOXAPARIN SODIUM 30 MILLIGRAM(S): 100 INJECTION SUBCUTANEOUS at 07:58

## 2022-03-09 RX ADMIN — SODIUM CHLORIDE 150 MILLILITER(S): 9 INJECTION, SOLUTION INTRAVENOUS at 02:49

## 2022-03-09 NOTE — DISCHARGE NOTE NURSING/CASE MANAGEMENT/SOCIAL WORK - NSDCPEFALRISK_GEN_ALL_CORE
For information on Fall & Injury Prevention, visit: [email or web address] OR  [email or web address] OR  https://www.cdc.gov/steadi/patient.html

## 2022-03-09 NOTE — PROGRESS NOTE ADULT - SUBJECTIVE AND OBJECTIVE BOX
Patient is a 41y old  Female who presents with a chief complaint of morbid obesity (09 Mar 2022 10:05)      INTERVAL HPI/OVERNIGHT EVENTS:    No overnight events, seen at bedside.      MEDICATIONS  (STANDING):  enoxaparin Injectable 30 milliGRAM(s) SubCutaneous every 12 hours  lactated ringers. 1000 milliLiter(s) (150 mL/Hr) IV Continuous <Continuous>  ondansetron Injectable 4 milliGRAM(s) IV Push every 6 hours  pantoprazole  Injectable 40 milliGRAM(s) IV Push daily    MEDICATIONS  (PRN):  acetaminophen   IVPB .. 1000 milliGRAM(s) IV Intermittent every 6 hours PRN Mild Pain (1 - 3)  HYDROmorphone  Injectable 0.5 milliGRAM(s) IV Push every 4 hours PRN Severe Pain (7 - 10)  hyoscyamine SL 0.125 milliGRAM(s) SubLingual every 6 hours PRN nausea/vomiting  ibuprofen IVPB .. 800 milliGRAM(s) IV Intermittent every 6 hours PRN Moderate Pain (4 - 6)      Allergies    No Known Allergies    Intolerances        REVIEW OF SYSTEMS:  CONSTITUTIONAL: No fever, weight loss, or fatigue  EYES: No eye pain, visual disturbances, or discharge  ENMT:  No difficulty hearing, tinnitus, vertigo; No sinus or throat pain  NECK: No pain or stiffness  BREASTS: No pain, masses, or nipple discharge  RESPIRATORY: No cough, wheezing, chills or hemoptysis; No shortness of breath  CARDIOVASCULAR: No chest pain, palpitations, lightheadedness, or leg swelling  GASTROINTESTINAL: No abdominal or epigastric pain. No nausea, vomiting, or hematemesis; No diarrhea or constipation. No melena or hematochezia.  GENITOURINARY: No dysuria, frequency, hematuria, or incontinence  NEUROLOGICAL: No headaches, memory loss, vertigo, loss of strength, numbness, or tremors  SKIN: No itching, burning, rashes, or lesions   LYMPH NODES: No enlarged glands  ENDOCRINE: No heat or cold intolerance; No hair loss; No polydipsia or polyuria  MUSCULOSKELETAL: No joint pain or swelling; No muscle, back, or extremity pain  PSYCHIATRIC: No depression, anxiety, or mood swings  HEME/LYMPH: No easy bruising, or bleeding gums  ALLERGY AND IMMUNOLOGIC: No hives or eczema    Vital Signs Last 24 Hrs  T(C): 37 (09 Mar 2022 07:30), Max: 37.6 (09 Mar 2022 03:04)  T(F): 98.6 (09 Mar 2022 07:30), Max: 99.7 (09 Mar 2022 03:04)  HR: 73 (09 Mar 2022 07:30) (66 - 94)  BP: 137/73 (09 Mar 2022 07:30) (114/82 - 147/82)  BP(mean): --  RR: 18 (09 Mar 2022 07:30) (16 - 25)  SpO2: 100% (09 Mar 2022 07:30) (91% - 100%)    PHYSICAL EXAM:  GENERAL: patient appears well, no acute distress, appropriately interactive  EYES: sclera clear, no exudates  LUNGS: good air entry bilaterally, clear to auscultation, symmetric breath sounds  HEART: soft S1/S2, regular rate and rhythm, no murmurs noted, no noted edema to bilateral lower extremities  GASTROINTESTINAL: abdomen is soft, nontender, nondistended, normoactive bowel sounds, no palpable masses  INTEGUMENT: good skin turgor, appropriate for ethnicity, appears well perfused, no jaundice noted  MUSCULOSKELETAL: no clubbing or cyanosis, no obvious deformity  NEUROLOGIC: awake, alert, oriented x3, good muscle tone in 4 extremities, no obvious sensory deficits  PSYCHIATRIC: mood is good, affect is congruent with mood, linear and logical thought process    LABS:                        13.9   12.92 )-----------( 247      ( 09 Mar 2022 07:01 )             41.4     09 Mar 2022 07:01    136    |  102    |  5      ----------------------------<  92     3.9     |  24     |  0.68     Ca    8.2        09 Mar 2022 07:01          CAPILLARY BLOOD GLUCOSE          RADIOLOGY & ADDITIONAL TESTS:

## 2022-03-09 NOTE — DISCHARGE NOTE NURSING/CASE MANAGEMENT/SOCIAL WORK - PATIENT PORTAL LINK FT
You can access the FollowMyHealth Patient Portal offered by [facility] by registering at the following website: [email or web address]. By joining [facility]’s FollowMyHealth portal, you will also be able to view your health information using other applications (apps) compatible with our system.

## 2022-03-09 NOTE — DISCHARGE NOTE PROVIDER - NSDCCPCAREPLAN_GEN_ALL_CORE_FT
PRINCIPAL DISCHARGE DIAGNOSIS  Diagnosis: Morbid obesity  Assessment and Plan of Treatment:       SECONDARY DISCHARGE DIAGNOSES  Diagnosis: Hiatal hernia  Assessment and Plan of Treatment:

## 2022-03-09 NOTE — DISCHARGE NOTE PROVIDER - NSDCFUSCHEDAPPT_GEN_ALL_CORE_FT
BONNIE NEWELL ; 03/15/2022 ; NPP Surg Bariatric [id number]  BONNIE NEWELL ; 04/14/2022 ; NPP Surg Bariatric [address] ; 05/03/2022 ; NPP Weightmgm [address]

## 2022-03-09 NOTE — DISCHARGE NOTE PROVIDER - NSDCCPTREATMENT_GEN_ALL_CORE_FT
PRINCIPAL PROCEDURE  Procedure: Laparoscopic sleeve gastrectomy with laparoscopic repair of hiatal hernia  Findings and Treatment:       SECONDARY PROCEDURE  Procedure: EGD, intraoperative  Findings and Treatment:

## 2022-03-09 NOTE — DISCHARGE NOTE PROVIDER - CARE PROVIDER_API CALL
Breana Coronel (MD)  Surgery  [address] [place] [address]  Phone: [phone]  Fax: [phone]  Follow Up Time:

## 2022-03-09 NOTE — DISCHARGE NOTE PROVIDER - NSDCMRMEDTOKEN_GEN_ALL_CORE_FT
acetaminophen 1000 mg oral powder for reconstitution: orally every 6 hour for minimum of 3 days and maximum of 5 days  omeprazole 40 mg oral delayed release capsule: 1 cap(s) orally once a day open and put contents in low fat yogurt or pudding    ondansetron 4 mg oral tablet, disintegratin tab(s) orally 3 times a day, As Needed  for nausea  oxyCODONE 5 mg oral tablet: 1 tab(s) orally every 6 hours, As Needed  for severe pain

## 2022-03-09 NOTE — DISCHARGE NOTE PROVIDER - HOSPITAL COURSE
--41-  year old female with history of morbid obesity s/p laparoscopic sleeve gastrectomy with hiatal hernia repair and intraoperative EGD. Post operatively patient did well, good urine output  and ambulating well on floor. Patient tolerated advancement of diet to bariatric clears.  Nutritional guidelines were reviewed with the nutritionist. Patient felt ready for discharge to home. Pt instructed to drink small frequent amounts, start protein drinks and follow dietary guidelines.

## 2022-03-09 NOTE — DISCHARGE NOTE PROVIDER - NSDCFUADDINST_GEN_ALL_CORE_FT
Increase ambulation and utilize incentive spirometry frequently.   Apply ice packs to abdominal wall/incision sites for 20 mins on/20 mins off every 4 hours for the next 24 hours and to shoulders as needed for discomfort.   Continue Bariatric Phase 1 Diet and follow nutritional guidelines as instructed.  Pain medications as needed. If taking narcotic pain medications, may take Colace/OTC stool softener as directed to prevent constipation.  Call office with any questions or concerns.

## 2022-03-09 NOTE — DISCHARGE NOTE PROVIDER - NSDCHHENCOUNTER_GEN_ALL_CORE
The patient is Watcher - Medium risk of patient condition declining or worsening    Shift Goals  Clinical Goals: PD, electrolyte balancing  Patient Goals: Rest, comfort    Progress made toward(s) clinical / shift goals:  Progressing       Problem: Knowledge Deficit - Standard  Goal: Patient and family/care givers will demonstrate understanding of plan of care, disease process/condition, diagnostic tests and medications  Outcome: Progressing  Note: Pt updated on POC. No questions at this time.      Problem: Fall Risk  Goal: Patient will remain free from falls  Outcome: Progressing  Note: All fall precautions put into place.       09-Mar-2022

## 2022-03-09 NOTE — PROGRESS NOTE ADULT - ASSESSMENT
S/P sleeve gastrectomy     Aftercare following surgery  -Early ambulation  -Pain management  -Incentive Spirometry  -DVT ppx as per primary team  leukocytosis likely reactive, asymptomatic  Medically optimized for DC    GERD  PPI      Thank you for allowing us to participate in the care of this patient. Our team will continue to follow along with you. Further recommendations to follow pending the clinical course.

## 2022-03-10 ENCOUNTER — NON-APPOINTMENT (OUTPATIENT)
Age: 42
End: 2022-03-10

## 2022-03-15 ENCOUNTER — APPOINTMENT (OUTPATIENT)
Dept: BARIATRICS | Facility: CLINIC | Age: 42
End: 2022-03-15
Payer: COMMERCIAL

## 2022-03-15 VITALS
BODY MASS INDEX: 38.91 KG/M2 | DIASTOLIC BLOOD PRESSURE: 73 MMHG | OXYGEN SATURATION: 99 % | TEMPERATURE: 96.7 F | WEIGHT: 219.58 LBS | HEIGHT: 63 IN | SYSTOLIC BLOOD PRESSURE: 120 MMHG | HEART RATE: 93 BPM

## 2022-03-15 DIAGNOSIS — L53.8 OTHER SPECIFIED ERYTHEMATOUS CONDITIONS: ICD-10-CM

## 2022-03-15 PROCEDURE — 99024 POSTOP FOLLOW-UP VISIT: CPT

## 2022-03-18 ENCOUNTER — NON-APPOINTMENT (OUTPATIENT)
Age: 42
End: 2022-03-18

## 2022-03-31 NOTE — HISTORY OF PRESENT ILLNESS
[Date of Surgery: ___] : Date of Surgery:   [unfilled] [Surgeon Name:   ___] : Surgeon Name: Dr. DE LOS SANTOS [Pre-Op Weight ___] : Pre-op weight was [unfilled] lbs [Procedure: ___] : Procedure performed: [unfilled]  [de-identified] : 41 year old F 1 WEEK s/p lap sleeve gastrectomy with intra op EGD and and hiatal hernia repair. Weight loss since last visit. Reports minimal incisional discomfort- dull and burning sensation at times. Pt states soon after being discharged from hospital, she developed a macular pruritic rash along torso on both sides. Pt is not sure whether she may be allergic to steri strips and indicated that rash is slowly resolving. Pt states she  is consuming a sufficient amount of protein and  zero calorie liquid per day. Tolerating  2  protein shakes per day. No reflux symptoms. No nausea or vomiting.Urine is light colored. Pt states she has been constipated since surgery. No reflux symptoms. Walks frequently for exercise. Sleeping  ~  6-8   hrs per day. \par

## 2022-03-31 NOTE — REVIEW OF SYSTEMS
[Recent Change In Weight] : ~T recent weight change [Skin Rash] : skin rash [Negative] : Heme/Lymph [Fever] : no fever [Fatigue] : no fatigue [Dysphagia] : no dysphagia [Chest Pain] : no chest pain [Palpitations] : no palpitations [Lower Ext Edema] : no lower extremity edema [Shortness Of Breath] : no shortness of breath [Wheezing] : no wheezing [Cough] : no cough [SOB on Exertion] : no shortness of breath during exertion [Abdominal Pain] : no abdominal pain [Vomiting] : no vomiting [Constipation] : no constipation [Diarrhea] : no diarrhea [Reflux/Heartburn] : no reflux/ heartburn [Dysuria] : no dysuria [Incontinence] : no incontinence [Joint Pain] : no joint pain [Joint Stiffness] : no joint stiffness [Skin Wound] : no skin wound [Confused] : no confusion [Suicidal] : not suicidal [FreeTextEntry2] : weight loss since last visit.  [de-identified] : macular rash bilateral torso

## 2022-03-31 NOTE — ASSESSMENT
[FreeTextEntry1] : 41 year old F 1 WEEK s/p lap sleeve gastrectomy with intra op EGD and and hiatal hernia repair.  Weight loss since last visit.\par \par Encourage increase daily fluid intake\par Protein focused diet and plan to start daily multi- vitamins\par Recommended Benadryl at bedtime if needed/ anti itch creams/ oatmeal soap / etc. Advised to call office if rash worsens. \par Exercise with both cardio and start  strength training at 6 weeks postop\par Discussed and reviewed constipation remedies with patient. \par Follow up telemedicine visit with nutritionist scheduled on 5/3/2022 \par Followup in 1 month \par Call with any questions or concerns

## 2022-03-31 NOTE — PHYSICAL EXAM
[Obese] : obese [Normal] : affect appropriate [de-identified] : EOMI , Anicteric  [de-identified] : obese soft non tender  along incision site / no erythema no swelling noted  no evidence of hernia + macular / erythematous rash along torso - bilateral

## 2022-04-07 ENCOUNTER — NON-APPOINTMENT (OUTPATIENT)
Age: 42
End: 2022-04-07

## 2022-04-14 ENCOUNTER — APPOINTMENT (OUTPATIENT)
Dept: BARIATRICS | Facility: CLINIC | Age: 42
End: 2022-04-14
Payer: COMMERCIAL

## 2022-04-14 VITALS
BODY MASS INDEX: 36.72 KG/M2 | TEMPERATURE: 96.7 F | OXYGEN SATURATION: 99 % | HEART RATE: 94 BPM | DIASTOLIC BLOOD PRESSURE: 72 MMHG | HEIGHT: 63 IN | SYSTOLIC BLOOD PRESSURE: 120 MMHG | WEIGHT: 207.23 LBS

## 2022-04-14 DIAGNOSIS — Z92.29 PERSONAL HISTORY OF OTHER DRUG THERAPY: ICD-10-CM

## 2022-04-14 PROBLEM — E66.9 OBESITY, UNSPECIFIED: Chronic | Status: ACTIVE | Noted: 2022-02-17

## 2022-04-14 PROBLEM — K21.9 GASTRO-ESOPHAGEAL REFLUX DISEASE WITHOUT ESOPHAGITIS: Chronic | Status: ACTIVE | Noted: 2022-02-17

## 2022-04-14 PROCEDURE — 99024 POSTOP FOLLOW-UP VISIT: CPT

## 2022-04-14 RX ORDER — ONDANSETRON 4 MG/1
4 TABLET, ORALLY DISINTEGRATING ORAL 4 TIMES DAILY
Qty: 15 | Refills: 0 | Status: DISCONTINUED | COMMUNITY
Start: 2022-02-28 | End: 2022-04-14

## 2022-04-14 RX ORDER — OXYCODONE 5 MG/1
5 TABLET ORAL
Qty: 6 | Refills: 0 | Status: DISCONTINUED | COMMUNITY
Start: 2022-02-28 | End: 2022-04-14

## 2022-04-14 RX ORDER — OMEPRAZOLE 20 MG/1
20 CAPSULE, DELAYED RELEASE ORAL DAILY
Qty: 30 | Refills: 1 | Status: DISCONTINUED | COMMUNITY
Start: 2022-02-28 | End: 2022-04-14

## 2022-04-14 NOTE — REASON FOR VISIT
[Post Operative Visit] : a post operative visit for [S/P Bariatric Surgery] : s/p bariatric surgery [Morbid Obesity (BMI<40)] : morbid obesity (bmi<40)

## 2022-04-14 NOTE — REVIEW OF SYSTEMS
[Recent Change In Weight] : ~T recent weight change [Negative] : Heme/Lymph [Fever] : no fever [Chills] : no chills [Night Sweats] : no night sweats [Fatigue] : no fatigue [Dysphagia] : no dysphagia [Chest Pain] : no chest pain [Palpitations] : no palpitations [Shortness Of Breath] : no shortness of breath [Cough] : no cough [SOB on Exertion] : no shortness of breath during exertion [Abdominal Pain] : no abdominal pain [Vomiting] : no vomiting [Constipation] : no constipation [Diarrhea] : no diarrhea [Reflux/Heartburn] : no reflux/ heartburn [FreeTextEntry2] : weight loss since last visit.

## 2022-04-14 NOTE — ASSESSMENT
[FreeTextEntry1] : 41 year old F 1 MONTH  s/p lap sleeve gastrectomy and hiatal hernia repair.  Weight loss since last visit.\par \par Encourage increase daily fluid intake \par Protein focused diet and continue daily multi- vitamins- continue 3 meal a day regimen - 1 protein shake as a meal replacement if needed. \par Exercise with both cardio and start  strength training at 6 weeks postop.\par Follow up telemedicine visit with nutritionist scheduled on 5/3/2022. \par Plan to discontinue daily PPI - No reflux symptoms. \par Followup in 8 weeks   \par Call with any questions or concerns\par Script given for routine blood work to be performed prior to next visit.\par

## 2022-04-14 NOTE — PHYSICAL EXAM
[Obese] : obese [Normal] : affect appropriate [de-identified] : EOMI , Anicteric  [de-identified] : obese soft non tender / no erythema no swelling noted  no evidence of hernia

## 2022-04-14 NOTE — HISTORY OF PRESENT ILLNESS
[Procedure: ___] : Procedure performed: [unfilled]  [Date of Surgery: ___] : Date of Surgery:   [unfilled] [Surgeon Name:   ___] : Surgeon Name: Dr. DE LOS SANTOS [Pre-Op Weight ___] : Pre-op weight was [unfilled] lbs [de-identified] : 41 year old F 1 MONTH  s/p lap sleeve gastrectomy and hiatal hernia repair.  Weight loss since last visit. Pt is tolerating soft and more textured foods without any issues.No nausea. or vomiting .Pt states she  is consuming an adequate  amount of zero calorie liquid and protein during the day. Pt is consuming 3 meals per day - 1 protein shake for breakfast as a meal replacement + 2 meals per day. Pt was unable to tolerate Bariatric fusion Chewables however is ready transition to capsules and will try Bariatric Fusion Capsules.. No constipation or diarrhea. No reflux symptoms. Pt is frequently walking for exercise .Sleeping  ~  6-8  hrs per day. Follow up with nutritionist is scheduled on 5/3/2022. \par

## 2022-05-03 ENCOUNTER — RX RENEWAL (OUTPATIENT)
Age: 42
End: 2022-05-03

## 2022-05-03 ENCOUNTER — APPOINTMENT (OUTPATIENT)
Dept: BARIATRICS/WEIGHT MGMT | Facility: CLINIC | Age: 42
End: 2022-05-03
Payer: COMMERCIAL

## 2022-05-03 PROCEDURE — 97803 MED NUTRITION INDIV SUBSEQ: CPT | Mod: 95

## 2022-05-10 VITALS — BODY MASS INDEX: 35.44 KG/M2 | WEIGHT: 200 LBS | HEIGHT: 63 IN

## 2022-06-10 ENCOUNTER — APPOINTMENT (OUTPATIENT)
Dept: BARIATRICS | Facility: CLINIC | Age: 42
End: 2022-06-10
Payer: COMMERCIAL

## 2022-06-10 VITALS
SYSTOLIC BLOOD PRESSURE: 120 MMHG | TEMPERATURE: 97 F | DIASTOLIC BLOOD PRESSURE: 68 MMHG | WEIGHT: 192.46 LBS | HEIGHT: 63 IN | HEART RATE: 84 BPM | OXYGEN SATURATION: 99 % | BODY MASS INDEX: 34.1 KG/M2

## 2022-06-10 DIAGNOSIS — Z87.19 PERSONAL HISTORY OF OTHER DISEASES OF THE DIGESTIVE SYSTEM: ICD-10-CM

## 2022-06-10 PROCEDURE — 99214 OFFICE O/P EST MOD 30 MIN: CPT

## 2022-06-13 NOTE — ASSESSMENT
[FreeTextEntry1] : 41 year old F is 3 months s/p LSG with intraop EGD and hiatal hernia repair. Very happy with recovery progress, feels great. Current weight 192 lbs, weight lost since last visit.\par Doing well.\par \par Reviewed guidelines about nutrition and snacking - protein focus diet\par Encouraged pt to increase dietary fiber intake - may consume fiber as snacks as needed\par Continue miralax/dulcolax as needed\par Continue MVI and daily zero calorie liquid intake 64 oz/day\par Continue daily exercise regimen incorporating cardio and strength training\par Track steps - goal approximately 8-10k steps per day\par \par Labs performed 6/2/2022, results reviewed with pt - no significant abnormalities, vitamin levels normal \par \par Return to office in 3 months\par All questions answered\par \par \par Additional time spent before and after visit reviewing chart

## 2022-06-13 NOTE — HISTORY OF PRESENT ILLNESS
[Procedure: ___] : Procedure performed: [unfilled]  [Date of Surgery: ___] : Date of Surgery:   [unfilled] [Surgeon Name:   ___] : Surgeon Name: Dr. DE LOS SANTOS [Pre-Op Weight ___] : Pre-op weight was [unfilled] lbs [___ Months Post Op] : [unfilled] months [de-identified] : 41 year old F is 3 months s/p LSG with intraop EGD and hiatal hernia repair. Very happy with recovery progress, feels great. Current weight 192 lbs, weight lost since last visit on 4/14/2022. Pt is consuming an adequate amount of protein each meal, consuming 2 meals/day and one protein shake/day. Taking approximately 20 min to consume each meal. Pt is tolerating textured and solid foods without any issues. Drinking adequate zero calorie liquid per day, averaging 60-70 oz/day. Reports BM once every 2-3 days, taking miralax and dulcolax. Pt is taking vitamin supplements as directed. Pt is exercising daily, walking/cycling/strength training - ~ 8-11k steps/day. Pt is currently sleeping ~ 8 hours/night. No abdominal pain, reflux, nausea, vomiting, constipation or diarrhea. Pt stated one incision has a stitch sticking out.\par

## 2022-06-13 NOTE — PHYSICAL EXAM
[Normal] : affect appropriate [de-identified] : soft, NT, ND, normoactive bowel sounds, no hepatosplenomegaly or masses or diastasis appreciated\par RUQ incision medial end has retained vicryl suture - removed successfully, other incisions well healed, no drainage or bleeding

## 2022-06-15 LAB
25(OH)D3 SERPL-MCNC: 62.2 NG/ML
ALBUMIN SERPL ELPH-MCNC: 4.4 G/DL
ALP BLD-CCNC: 61 U/L
ALT SERPL-CCNC: 14 U/L
ANION GAP SERPL CALC-SCNC: 11 MMOL/L
AST SERPL-CCNC: 11 U/L
BASOPHILS # BLD AUTO: 0.02 K/UL
BASOPHILS NFR BLD AUTO: 0.3 %
BILIRUB SERPL-MCNC: 0.8 MG/DL
BUN SERPL-MCNC: 9 MG/DL
CALCIUM SERPL-MCNC: 9.5 MG/DL
CHLORIDE SERPL-SCNC: 104 MMOL/L
CHOLEST SERPL-MCNC: 143 MG/DL
CO2 SERPL-SCNC: 23 MMOL/L
CREAT SERPL-MCNC: 0.6 MG/DL
EGFR: 116 ML/MIN/1.73M2
EOSINOPHIL # BLD AUTO: 0.04 K/UL
EOSINOPHIL NFR BLD AUTO: 0.6 %
ESTIMATED AVERAGE GLUCOSE: 94 MG/DL
FERRITIN SERPL-MCNC: 123 NG/ML
FOLATE SERPL-MCNC: >20 NG/ML
GLUCOSE SERPL-MCNC: 95 MG/DL
HBA1C MFR BLD HPLC: 4.9 %
HCT VFR BLD CALC: 48.2 %
HDLC SERPL-MCNC: 53 MG/DL
HGB BLD-MCNC: 15.5 G/DL
IMM GRANULOCYTES NFR BLD AUTO: 0 %
IRON SATN MFR SERPL: 43 %
IRON SERPL-MCNC: 110 UG/DL
LDLC SERPL CALC-MCNC: 76 MG/DL
LYMPHOCYTES # BLD AUTO: 2.46 K/UL
LYMPHOCYTES NFR BLD AUTO: 37.4 %
MAN DIFF?: NORMAL
MCHC RBC-ENTMCNC: 29.9 PG
MCHC RBC-ENTMCNC: 32.2 GM/DL
MCV RBC AUTO: 93.1 FL
MONOCYTES # BLD AUTO: 0.64 K/UL
MONOCYTES NFR BLD AUTO: 9.7 %
NEUTROPHILS # BLD AUTO: 3.41 K/UL
NEUTROPHILS NFR BLD AUTO: 52 %
NONHDLC SERPL-MCNC: 90 MG/DL
PLATELET # BLD AUTO: 252 K/UL
POTASSIUM SERPL-SCNC: 5 MMOL/L
PROT SERPL-MCNC: 7 G/DL
RBC # BLD: 5.18 M/UL
RBC # FLD: 13.5 %
SODIUM SERPL-SCNC: 138 MMOL/L
TIBC SERPL-MCNC: 254 UG/DL
TRIGL SERPL-MCNC: 73 MG/DL
TSH SERPL-ACNC: 1.64 UIU/ML
UIBC SERPL-MCNC: 144 UG/DL
VIT A SERPL-MCNC: 25 UG/DL
VIT B1 SERPL-MCNC: 124.4 NMOL/L
VIT B12 SERPL-MCNC: 662 PG/ML
VIT B6 SERPL-MCNC: 21.5 UG/L
WBC # FLD AUTO: 6.57 K/UL
ZINC SERPL-MCNC: 71 UG/DL

## 2022-08-02 ENCOUNTER — APPOINTMENT (OUTPATIENT)
Dept: BARIATRICS/WEIGHT MGMT | Facility: CLINIC | Age: 42
End: 2022-08-02

## 2022-08-02 PROCEDURE — 97803 MED NUTRITION INDIV SUBSEQ: CPT | Mod: 95

## 2022-08-03 VITALS — BODY MASS INDEX: 31.71 KG/M2 | WEIGHT: 179 LBS | HEIGHT: 63 IN

## 2022-09-09 ENCOUNTER — APPOINTMENT (OUTPATIENT)
Dept: BARIATRICS | Facility: CLINIC | Age: 42
End: 2022-09-09

## 2022-09-09 VITALS
OXYGEN SATURATION: 98 % | HEIGHT: 63 IN | HEART RATE: 74 BPM | BODY MASS INDEX: 31.13 KG/M2 | DIASTOLIC BLOOD PRESSURE: 72 MMHG | TEMPERATURE: 97.3 F | SYSTOLIC BLOOD PRESSURE: 120 MMHG | WEIGHT: 175.71 LBS

## 2022-09-09 PROCEDURE — 99214 OFFICE O/P EST MOD 30 MIN: CPT

## 2022-09-09 RX ORDER — LORATADINE 10 MG
17 TABLET,DISINTEGRATING ORAL
Refills: 0 | Status: DISCONTINUED | COMMUNITY
End: 2022-09-09

## 2022-09-14 ENCOUNTER — RX RENEWAL (OUTPATIENT)
Age: 42
End: 2022-09-14

## 2022-11-04 NOTE — PROGRESS NOTE ADULT - SUBJECTIVE AND OBJECTIVE BOX
Pre-Op Dx: Morbid obesity  Procedure: Laparoscopic sleeve gastrectomy with laparoscopic repair of hiatal hernia    EGD, intraoperative      Surgeon: Berna    HPI:   41y year old Female s/p Laparoscopic sleeve gastrectomy with laparoscopic repair of hiatal hernia    EGD, intraoperative     POD # 1  Patient is ambulating on the floor and utilizing incentive spirometry. She is tolerating clear liquid diet and urinating well. Minimal incisional discomfort. Denies any nausea or vomiting. Pt seen and examined at the bedside.     VITALS:  Vital Signs Last 24 Hrs  T(C): 37 (09 Mar 2022 07:30), Max: 37.6 (09 Mar 2022 03:04)  T(F): 98.6 (09 Mar 2022 07:30), Max: 99.7 (09 Mar 2022 03:04)  HR: 73 (09 Mar 2022 07:30) (66 - 94)  BP: 137/73 (09 Mar 2022 07:30) (114/82 - 147/82)  BP(mean): --  RR: 18 (09 Mar 2022 07:30) (16 - 25)  SpO2: 100% (09 Mar 2022 07:30) (91% - 100%)    03-08 @ 07:01  -  03-09 @ 07:00  --------------------------------------------------------  IN: 5475 mL / OUT: 1970 mL / NET: 3505 mL    03-09 @ 07:01  -  03-09 @ 10:06  --------------------------------------------------------  IN: 0 mL / OUT: 300 mL / NET: -300 mL        LABS:                        13.9   12.92 )-----------( 247      ( 09 Mar 2022 07:01 )             41.4     03-09    136  |  102  |  5<L>  ----------------------------<  92  3.9   |  24  |  0.68    Ca    8.2<L>      09 Mar 2022 07:01        CAPILLARY BLOOD GLUCOSE                Physical Exam:  General: A/O x 3, NAD, resting comfortably in bed  Abdominal: soft, ND, nontender to palpation, incisions C/D/I  Extremities: no edema, no calf tenderness B/L    Radiology and Additional Studies:    Plan  for discharge after 4 today   No

## 2022-12-13 LAB
25(OH)D3 SERPL-MCNC: 68.6 NG/ML
A-TOCOPHEROL VIT E SERPL-MCNC: 13.3 MG/L
ALBUMIN SERPL ELPH-MCNC: 4.1 G/DL
ALP BLD-CCNC: 54 U/L
ALT SERPL-CCNC: 13 U/L
ANION GAP SERPL CALC-SCNC: 10 MMOL/L
APTT BLD: 31.6 SEC
AST SERPL-CCNC: 17 U/L
BASOPHILS # BLD AUTO: 0.03 K/UL
BASOPHILS NFR BLD AUTO: 0.6 %
BETA+GAMMA TOCOPHEROL SERPL-MCNC: 0.8 MG/L
BILIRUB SERPL-MCNC: 0.5 MG/DL
BUN SERPL-MCNC: 10 MG/DL
CALCIUM SERPL-MCNC: 9.5 MG/DL
CHLORIDE SERPL-SCNC: 104 MMOL/L
CHOLEST SERPL-MCNC: 147 MG/DL
CO2 SERPL-SCNC: 27 MMOL/L
CREAT SERPL-MCNC: 0.67 MG/DL
EGFR: 113 ML/MIN/1.73M2
EOSINOPHIL # BLD AUTO: 0.07 K/UL
EOSINOPHIL NFR BLD AUTO: 1.4 %
ESTIMATED AVERAGE GLUCOSE: 97 MG/DL
FERRITIN SERPL-MCNC: 144 NG/ML
FOLATE SERPL-MCNC: >20 NG/ML
GLUCOSE SERPL-MCNC: 88 MG/DL
HBA1C MFR BLD HPLC: 5 %
HCT VFR BLD CALC: 43.1 %
HDLC SERPL-MCNC: 56 MG/DL
HGB BLD-MCNC: 14.3 G/DL
IMM GRANULOCYTES NFR BLD AUTO: 0.2 %
INR PPP: 1.08 RATIO
IRON SATN MFR SERPL: 34 %
IRON SERPL-MCNC: 77 UG/DL
LDLC SERPL CALC-MCNC: 78 MG/DL
LYMPHOCYTES # BLD AUTO: 1.95 K/UL
LYMPHOCYTES NFR BLD AUTO: 38.1 %
MAN DIFF?: NORMAL
MCHC RBC-ENTMCNC: 30.6 PG
MCHC RBC-ENTMCNC: 33.2 GM/DL
MCV RBC AUTO: 92.1 FL
MENADIONE SERPL-MCNC: 0.67 NG/ML
MONOCYTES # BLD AUTO: 0.55 K/UL
MONOCYTES NFR BLD AUTO: 10.7 %
NEUTROPHILS # BLD AUTO: 2.51 K/UL
NEUTROPHILS NFR BLD AUTO: 49 %
NONHDLC SERPL-MCNC: 91 MG/DL
PLATELET # BLD AUTO: 233 K/UL
POTASSIUM SERPL-SCNC: 4 MMOL/L
PROT SERPL-MCNC: 6.6 G/DL
PT BLD: 12.7 SEC
RBC # BLD: 4.68 M/UL
RBC # FLD: 12.7 %
SODIUM SERPL-SCNC: 141 MMOL/L
TIBC SERPL-MCNC: 225 UG/DL
TRIGL SERPL-MCNC: 68 MG/DL
TSH SERPL-ACNC: 0.93 UIU/ML
UIBC SERPL-MCNC: 148 UG/DL
VIT A SERPL-MCNC: 25 UG/DL
VIT B1 SERPL-MCNC: 130.8 NMOL/L
VIT B12 SERPL-MCNC: 1419 PG/ML
VIT C SERPL-MCNC: 1.3 MG/DL
WBC # FLD AUTO: 5.12 K/UL
ZINC SERPL-MCNC: 87 UG/DL

## 2022-12-13 NOTE — REVIEW OF SYSTEMS
[Negative] : Allergic/Immunologic [de-identified] : noticed old ecchymosis to thighs laterally for past month

## 2022-12-13 NOTE — PHYSICAL EXAM
[Normal] : affect appropriate [de-identified] : soft, NT, ND, no diastasis or hernias appreciated, incisions well healed  [de-identified] : no appreciable ecchymosis noted to bilateral LE, superficial varicosities noted

## 2022-12-13 NOTE — ASSESSMENT
[FreeTextEntry1] : 42 year old F is 6 months s/p Laparoscopic Sleeve Gastrectomy and hiatal hernia repair. Feels great. Weight loss since last visit.\par Doing well.\par \par Reviewed guidelines about nutrition and snacking - protein focus diet, 3 meals/day\par Encouraged pt to increase dietary fiber intake - preprinted constipation list given to pt\par Advised colace TID and psyllium with adequate water intake\par Continue daily bariatric MVI and zero calorie liquid intake 64 oz/day\par Continue daily exercise regimen incorporating cardio and strength training\par Track steps - goal approximately 8-10k steps per day\par \par Labs performed 6/2/2022, results reviewed with pt - no significant abnormalities, vitamin levels, iron and coags normal,  \par Labs ordered to be done before next visit \par \par Return to office in 3 months\par Follow up with Nutritionist and nutrition classes\par Follow up with PCP for ecchymosis\par All questions answered\par \par \par Additional time spent before and after visit reviewing chart

## 2022-12-13 NOTE — HISTORY OF PRESENT ILLNESS
[Procedure: ___] : Procedure performed: [unfilled]  [Date of Surgery: ___] : Date of Surgery:   [unfilled] [Surgeon Name:   ___] : Surgeon Name: Dr. DE LOS SANTOS [Pre-Op Weight ___] : Pre-op weight was [unfilled] lbs [___ Months Post Op] : [unfilled] months [de-identified] : 42 year old F is 6 months s/p Laparoscopic Sleeve Gastrectomy and hiatal hernia repair. Feels great. Weight loss since last visit. Saw nutrition 8/2/2022. Consuming an adequate protein intake, 2 meals/day and one protein shake/day. Taking approximately 20 min to consume each meal. Drinking adequate zero calorie liquid, averaging 60-70 oz/day. Taking miralax occasionally for constipation. Taking bariatric vitamin daily. Pt is exercising daily, walking/cycling/strength training. Sleeping ~ 8 hours/night. No abdominal pain, reflux, nausea, or vomiting. \par \par Pt adds noticed old ecchymosis to thighs laterally for past month, questioning if related to vitamins.

## 2022-12-14 ENCOUNTER — APPOINTMENT (OUTPATIENT)
Dept: BARIATRICS | Facility: CLINIC | Age: 42
End: 2022-12-14

## 2022-12-14 VITALS
OXYGEN SATURATION: 100 % | HEART RATE: 63 BPM | TEMPERATURE: 96.7 F | HEIGHT: 63 IN | DIASTOLIC BLOOD PRESSURE: 70 MMHG | SYSTOLIC BLOOD PRESSURE: 120 MMHG | WEIGHT: 161.38 LBS | BODY MASS INDEX: 28.59 KG/M2

## 2022-12-14 PROCEDURE — 99215 OFFICE O/P EST HI 40 MIN: CPT

## 2022-12-14 PROCEDURE — 99401 PREV MED CNSL INDIV APPRX 15: CPT | Mod: 25

## 2022-12-14 RX ORDER — OMEPRAZOLE 40 MG/1
40 CAPSULE, DELAYED RELEASE ORAL
Qty: 30 | Refills: 3 | Status: DISCONTINUED | COMMUNITY
Start: 2022-01-12 | End: 2022-12-14

## 2022-12-15 NOTE — HISTORY OF PRESENT ILLNESS
[Procedure: ___] : Procedure performed: [unfilled]  [Date of Surgery: ___] : Date of Surgery:   [unfilled] [Surgeon Name:   ___] : Surgeon Name: Dr. DE LOS SANTOS [Pre-Op Weight ___] : Pre-op weight was [unfilled] lbs [de-identified] : 42 year old F is 9 months s/p Laparoscopic Sleeve Gastrectomy and hiatal hernia repair. Feels great, recovered from Covid last month. Weight loss since last visit. Saw nutrition 8/2/2022. Consuming an adequate protein intake, 2 meals/day and one protein shake/day. Taking 15 min to consume each meal. Reported reflux when had Covid, took omeprazole and now resolved. Drinking adequate zero calorie liquid, averaging 70 oz/day. Taking miralax and colace occasionally for constipation, unable to tolerate metamucil. Taking bariatric vitamin daily. Pt is exercising daily, walking/cycling/strength training. Sleeping ~ 8 hours/night. No abdominal pain, reflux, nausea, or vomiting.

## 2022-12-15 NOTE — REVIEW OF SYSTEMS
[Negative] : Allergic/Immunologic [de-identified] : noticed old ecchymosis to thighs laterally for past month

## 2022-12-15 NOTE — ASSESSMENT
[FreeTextEntry1] : 42 year old F is 9 months s/p Laparoscopic Sleeve Gastrectomy and hiatal hernia repair. Feels great. Recovered from Covid last month. Weight loss since last visit. Saw nutrition 8/2/2022. Reported reflux when had Covid, took omeprazole and now resolved.\par Doing well.\par \par Greater than 15 minutes spent with pt discussing importance of health maintenance principles including dietary and lifestyle changes as well as long-term weight maintenance\par Reviewed guidelines about nutrition and snacking - protein focus diet with 3 meals/day\par Continue daily bariatric MVI and zero calorie liquid intake 64 oz/day\par Continue Colace for constipation and trial Dulcolax or senna\par Continue daily exercise regimen incorporating cardio and strength training\par Track steps - goal approximately 8-10k steps per day\par \par Lab performed 12/3/2022, results reviewed with pt\par All vitamin levels normal \par Otherwise no other significant abnormalities.\par \par Return to office in 3 months\par Follow up with Nutritionist and nutrition classes\par All questions answered\par \par \par Additional time spent before and after visit reviewing chart

## 2022-12-15 NOTE — PHYSICAL EXAM
[Normal] : affect appropriate [de-identified] : soft, NT, ND, no diastasis or hernias appreciated, incisions well healed

## 2023-01-08 ENCOUNTER — NON-APPOINTMENT (OUTPATIENT)
Age: 43
End: 2023-01-08

## 2023-01-29 NOTE — DISCHARGE NOTE NURSING/CASE MANAGEMENT/SOCIAL WORK - DATE OF LAST VACCINATION
Decadron 10mg IV Q* for 48hrs if ther is no contraindication  Soft diet   We'll continue to follow
04-May-2021

## 2023-02-14 ENCOUNTER — APPOINTMENT (OUTPATIENT)
Dept: BARIATRICS/WEIGHT MGMT | Facility: CLINIC | Age: 43
End: 2023-02-14
Payer: COMMERCIAL

## 2023-02-14 PROCEDURE — 97803 MED NUTRITION INDIV SUBSEQ: CPT | Mod: 95

## 2023-02-16 VITALS — BODY MASS INDEX: 26.75 KG/M2 | HEIGHT: 63 IN | WEIGHT: 151 LBS

## 2023-03-15 ENCOUNTER — APPOINTMENT (OUTPATIENT)
Dept: BARIATRICS | Facility: CLINIC | Age: 43
End: 2023-03-15
Payer: COMMERCIAL

## 2023-03-15 ENCOUNTER — NON-APPOINTMENT (OUTPATIENT)
Age: 43
End: 2023-03-15

## 2023-03-15 VITALS
DIASTOLIC BLOOD PRESSURE: 72 MMHG | HEART RATE: 81 BPM | SYSTOLIC BLOOD PRESSURE: 120 MMHG | BODY MASS INDEX: 27.5 KG/M2 | TEMPERATURE: 97 F | WEIGHT: 155.2 LBS | OXYGEN SATURATION: 98 % | HEIGHT: 63 IN

## 2023-03-15 DIAGNOSIS — R63.8 OTHER SYMPTOMS AND SIGNS CONCERNING FOOD AND FLUID INTAKE: ICD-10-CM

## 2023-03-15 DIAGNOSIS — R79.9 ABNORMAL FINDING OF BLOOD CHEMISTRY, UNSPECIFIED: ICD-10-CM

## 2023-03-15 PROCEDURE — 99214 OFFICE O/P EST MOD 30 MIN: CPT

## 2023-03-15 RX ORDER — LORATADINE 10 MG
17 TABLET,DISINTEGRATING ORAL
Refills: 0 | Status: DISCONTINUED | COMMUNITY
End: 2023-03-15

## 2023-03-15 RX ORDER — CALCIUM CARBONATE/VITAMIN D3 500 MG-2.5
TABLET,CHEWABLE ORAL DAILY
Refills: 0 | Status: ACTIVE | COMMUNITY

## 2023-03-15 RX ORDER — FLUTICASONE PROPIONATE 50 MCG
50 SPRAY, SUSPENSION NASAL
Refills: 2 | Status: ACTIVE | COMMUNITY

## 2023-03-19 NOTE — HISTORY OF PRESENT ILLNESS
Advise her to re-start the Lyrica 200 mg daily HS for now and then in 2-3 weeks when she would be do for a refill, she should call and we can change it to 300 mg HS and go from there  Thank you  [Procedure: ___] : Procedure performed: [unfilled]  [Date of Surgery: ___] : Date of Surgery:   [unfilled] [Surgeon Name:   ___] : Surgeon Name: Dr. DE LOS SANTOS [Pre-Op Weight ___] : Pre-op weight was [unfilled] lbs [de-identified] : 42 year old F is 12 months s/p Laparoscopic Sleeve Gastrectomy and hiatal hernia repair. Feels great. Weight loss since last visit. Saw nutrition 2/14/2023. Consuming an adequate protein intake, 2 meals/day with one protein shake/day. Taking 20 min to consume each meal. Reflux only with inciting foods (rarely), taking omeprazole as needed. Drinking adequate zero calorie liquid, ~70 oz/day. Taking pre and pro-biotic with Colace for constipation - have tried Miralax, Metamucil, Dulcolax. Taking bariatric vitamin daily. Exercising daily, walking/cycling/strength training. Sleeping ~ 7-8 hours/night. No abdominal pain, nausea, or vomiting.

## 2023-03-19 NOTE — PHYSICAL EXAM
[Normal] : full range of motion and no deformities appreciated [de-identified] : soft, NT, ND, no diastasis or hernias appreciated, incisions well healed  [de-identified] : BRENT

## 2023-04-04 LAB
25(OH)D3 SERPL-MCNC: 67.4 NG/ML
ALBUMIN SERPL ELPH-MCNC: 4.3 G/DL
ALP BLD-CCNC: 53 U/L
ALT SERPL-CCNC: 12 U/L
ANION GAP SERPL CALC-SCNC: 13 MMOL/L
AST SERPL-CCNC: 15 U/L
BASOPHILS # BLD AUTO: 0.04 K/UL
BASOPHILS NFR BLD AUTO: 0.6 %
BILIRUB SERPL-MCNC: 0.7 MG/DL
BUN SERPL-MCNC: 12 MG/DL
CALCIUM SERPL-MCNC: 9.7 MG/DL
CHLORIDE SERPL-SCNC: 103 MMOL/L
CO2 SERPL-SCNC: 26 MMOL/L
CREAT SERPL-MCNC: 0.72 MG/DL
EGFR: 108 ML/MIN/1.73M2
EOSINOPHIL # BLD AUTO: 0.06 K/UL
EOSINOPHIL NFR BLD AUTO: 1 %
FERRITIN SERPL-MCNC: 181 NG/ML
FOLATE SERPL-MCNC: >20 NG/ML
GLUCOSE SERPL-MCNC: 89 MG/DL
HCT VFR BLD CALC: 45.7 %
HGB BLD-MCNC: 14.6 G/DL
IMM GRANULOCYTES NFR BLD AUTO: 0.2 %
IRON SERPL-MCNC: 129 UG/DL
LYMPHOCYTES # BLD AUTO: 2.52 K/UL
LYMPHOCYTES NFR BLD AUTO: 40.1 %
MAN DIFF?: NORMAL
MCHC RBC-ENTMCNC: 29.6 PG
MCHC RBC-ENTMCNC: 31.9 GM/DL
MCV RBC AUTO: 92.5 FL
MONOCYTES # BLD AUTO: 0.6 K/UL
MONOCYTES NFR BLD AUTO: 9.6 %
NEUTROPHILS # BLD AUTO: 3.05 K/UL
NEUTROPHILS NFR BLD AUTO: 48.5 %
PLATELET # BLD AUTO: 240 K/UL
POTASSIUM SERPL-SCNC: 4.7 MMOL/L
PROT SERPL-MCNC: 6.7 G/DL
RBC # BLD: 4.94 M/UL
RBC # FLD: 12.7 %
SODIUM SERPL-SCNC: 142 MMOL/L
VIT A SERPL-MCNC: 30.8 UG/DL
VIT B1 SERPL-MCNC: 137.7 NMOL/L
VIT B12 SERPL-MCNC: 1145 PG/ML
WBC # FLD AUTO: 6.28 K/UL
ZINC SERPL-MCNC: 100 UG/DL

## 2023-04-25 ENCOUNTER — APPOINTMENT (OUTPATIENT)
Dept: BARIATRICS/WEIGHT MGMT | Facility: CLINIC | Age: 43
End: 2023-04-25
Payer: COMMERCIAL

## 2023-04-25 PROCEDURE — 97803 MED NUTRITION INDIV SUBSEQ: CPT | Mod: 95

## 2023-04-26 VITALS — WEIGHT: 150 LBS | HEIGHT: 63 IN | BODY MASS INDEX: 26.58 KG/M2

## 2023-05-25 NOTE — DISCHARGE NOTE PROVIDER - REASON FOR ADMISSION
Problem: At Risk for Falls  Goal: # Patient does not fall  Outcome: Outcome Met, Continue evaluating goal progress toward completion  Goal: # Takes action to control fall-related risks  Outcome: Outcome Met, Continue evaluating goal progress toward completion  Goal: # Verbalizes understanding of fall risk/precautions  Description: Document education using the patient education activity  Outcome: Outcome Met, Continue evaluating goal progress toward completion     Problem: At Risk for Injury Due to Fall  Goal: # Patient does not fall  Outcome: Outcome Met, Continue evaluating goal progress toward completion  Goal: # Takes action to control condition specific risks  Outcome: Outcome Met, Continue evaluating goal progress toward completion  Goal: # Verbalizes understanding of fall-related injury personal risks  Description: Document education using the patient education activity  Outcome: Outcome Met, Continue evaluating goal progress toward completion      40 yo F with PMHx of morbid obesity admitted to [facility] for scheduled laparoscopic sleeve gastrectomy with hiatal hernia repair.

## 2023-08-03 ENCOUNTER — NON-APPOINTMENT (OUTPATIENT)
Age: 43
End: 2023-08-03

## 2023-08-08 ENCOUNTER — APPOINTMENT (OUTPATIENT)
Dept: ENDOCRINOLOGY | Facility: CLINIC | Age: 43
End: 2023-08-08

## 2023-09-19 ENCOUNTER — APPOINTMENT (OUTPATIENT)
Dept: BARIATRICS | Facility: CLINIC | Age: 43
End: 2023-09-19
Payer: COMMERCIAL

## 2023-09-19 VITALS
SYSTOLIC BLOOD PRESSURE: 120 MMHG | TEMPERATURE: 96.7 F | HEIGHT: 63 IN | WEIGHT: 148 LBS | DIASTOLIC BLOOD PRESSURE: 76 MMHG | HEART RATE: 81 BPM | OXYGEN SATURATION: 99 % | BODY MASS INDEX: 26.22 KG/M2

## 2023-09-19 DIAGNOSIS — E46 UNSPECIFIED PROTEIN-CALORIE MALNUTRITION: ICD-10-CM

## 2023-09-19 DIAGNOSIS — K91.2 POSTSURGICAL MALABSORPTION, NOT ELSEWHERE CLASSIFIED: ICD-10-CM

## 2023-09-19 DIAGNOSIS — Z90.3 POSTSURGICAL MALABSORPTION, NOT ELSEWHERE CLASSIFIED: ICD-10-CM

## 2023-09-19 DIAGNOSIS — E66.3 OVERWEIGHT: ICD-10-CM

## 2023-09-19 PROCEDURE — 99214 OFFICE O/P EST MOD 30 MIN: CPT

## 2023-09-20 PROBLEM — E46 SUBOPTIMAL NUTRITION: Status: ACTIVE | Noted: 2023-09-20

## 2023-09-20 PROBLEM — K91.2 POSTGASTRECTOMY MALABSORPTION: Status: ACTIVE | Noted: 2023-09-20

## 2023-12-27 ENCOUNTER — APPOINTMENT (OUTPATIENT)
Dept: ENDOCRINOLOGY | Facility: CLINIC | Age: 43
End: 2023-12-27
Payer: COMMERCIAL

## 2023-12-27 ENCOUNTER — LABORATORY RESULT (OUTPATIENT)
Age: 43
End: 2023-12-27

## 2023-12-27 VITALS
BODY MASS INDEX: 25.87 KG/M2 | HEIGHT: 63 IN | HEART RATE: 85 BPM | TEMPERATURE: 98.7 F | OXYGEN SATURATION: 98 % | SYSTOLIC BLOOD PRESSURE: 110 MMHG | WEIGHT: 146 LBS | DIASTOLIC BLOOD PRESSURE: 75 MMHG

## 2023-12-27 DIAGNOSIS — K59.00 CONSTIPATION, UNSPECIFIED: ICD-10-CM

## 2023-12-27 DIAGNOSIS — R68.89 OTHER GENERAL SYMPTOMS AND SIGNS: ICD-10-CM

## 2023-12-27 PROCEDURE — 99213 OFFICE O/P EST LOW 20 MIN: CPT | Mod: 25

## 2023-12-27 PROCEDURE — 36415 COLL VENOUS BLD VENIPUNCTURE: CPT

## 2023-12-27 RX ORDER — DOCUSATE SODIUM 100 MG/1
100 CAPSULE ORAL DAILY
Refills: 0 | Status: COMPLETED | COMMUNITY
End: 2023-12-27

## 2024-01-01 PROBLEM — K59.00 CONSTIPATION IN FEMALE: Status: ACTIVE | Noted: 2022-06-10

## 2024-01-01 PROBLEM — R68.89 COLD INTOLERANCE: Status: ACTIVE | Noted: 2023-12-27

## 2024-01-01 LAB
25(OH)D3 SERPL-MCNC: 56.8 NG/ML
ALBUMIN SERPL ELPH-MCNC: 4.3 G/DL
ALP BLD-CCNC: 50 U/L
ALT SERPL-CCNC: 15 U/L
ANION GAP SERPL CALC-SCNC: 10 MMOL/L
AST SERPL-CCNC: 19 U/L
BILIRUB SERPL-MCNC: 0.5 MG/DL
BUN SERPL-MCNC: 8 MG/DL
CALCIUM SERPL-MCNC: 8.9 MG/DL
CELIACPAN: NORMAL
CHLORIDE SERPL-SCNC: 103 MMOL/L
CHOLEST SERPL-MCNC: 138 MG/DL
CO2 SERPL-SCNC: 25 MMOL/L
CREAT SERPL-MCNC: 0.57 MG/DL
EGFR: 116 ML/MIN/1.73M2
ESTIMATED AVERAGE GLUCOSE: 97 MG/DL
FSH SERPL-MCNC: 5.6 IU/L
GLUCOSE SERPL-MCNC: 93 MG/DL
HBA1C MFR BLD HPLC: 5 %
HCT VFR BLD CALC: 44.8 %
HDLC SERPL-MCNC: 59 MG/DL
HGB BLD-MCNC: 15.1 G/DL
LDLC SERPL CALC-MCNC: 62 MG/DL
LH SERPL-ACNC: 10.5 IU/L
MCHC RBC-ENTMCNC: 30.1 PG
MCHC RBC-ENTMCNC: 33.7 GM/DL
MCV RBC AUTO: 89.2 FL
NONHDLC SERPL-MCNC: 80 MG/DL
PLATELET # BLD AUTO: 227 K/UL
POTASSIUM SERPL-SCNC: 4 MMOL/L
PROT SERPL-MCNC: 6.5 G/DL
RBC # BLD: 5.02 M/UL
RBC # FLD: 12.6 %
SODIUM SERPL-SCNC: 139 MMOL/L
T4 FREE SERPL-MCNC: 1.4 NG/DL
THYROGLOB AB SERPL-ACNC: <20 IU/ML
THYROPEROXIDASE AB SERPL IA-ACNC: <10 IU/ML
TRIGL SERPL-MCNC: 95 MG/DL
TSH SERPL-ACNC: 0.69 UIU/ML
VIT B12 SERPL-MCNC: 1115 PG/ML
WBC # FLD AUTO: 4.84 K/UL

## 2024-01-01 NOTE — HISTORY OF PRESENT ILLNESS
[FreeTextEntry1] : CC: Check hormones   This is a 43-year-old female with PCOS, history of obesity s/p laparoscopic sleeve gastrectomy in March 2021, here for consultation of cold intolerance and constipation.   Reports she has had generalized cold intolerance for several years, worse in her digits. Reports toes sometimes turn purple color. Has also had constipation for several years which has worsened after bariatric surgery.  Has had difficulty losing weight despite diet and exercise.   Diagnosed with PCOS 2014. She is on inositol powder supplement. She was on OCP in the past but developed irritability. Menstrual cycle is approximately 28-32 days.

## 2024-01-01 NOTE — ASSESSMENT
[FreeTextEntry1] : This is a 43-year-old female with PCOS, history of obesity s/p laparoscopic sleeve gastrectomy in March 2021, here for consultation of cold intolerance and constipation.  1. Cold intolerance Check TFTS and thyroid antibodies. May need rheumatology evaluation for Raynaud's  2. Constipation Check celiac panel.

## 2024-01-01 NOTE — REVIEW OF SYSTEMS
[All other systems negative] : All other systems negative [Constipation] : constipation [Cold Intolerance] : cold intolerance [FreeTextEntry2] : difficulty losing weight

## 2024-02-06 ENCOUNTER — APPOINTMENT (OUTPATIENT)
Dept: RHEUMATOLOGY | Facility: CLINIC | Age: 44
End: 2024-02-06
Payer: COMMERCIAL

## 2024-02-06 VITALS
BODY MASS INDEX: 26.22 KG/M2 | HEIGHT: 63 IN | SYSTOLIC BLOOD PRESSURE: 102 MMHG | HEART RATE: 88 BPM | TEMPERATURE: 97.6 F | OXYGEN SATURATION: 98 % | DIASTOLIC BLOOD PRESSURE: 68 MMHG | WEIGHT: 148 LBS

## 2024-02-06 DIAGNOSIS — I73.00 RAYNAUD'S SYNDROME W/OUT GANGRENE: ICD-10-CM

## 2024-02-06 DIAGNOSIS — R20.9 UNSPECIFIED DISTURBANCES OF SKIN SENSATION: ICD-10-CM

## 2024-02-06 PROCEDURE — 99204 OFFICE O/P NEW MOD 45 MIN: CPT

## 2024-02-06 NOTE — ASSESSMENT
[FreeTextEntry1] : 43-year-old female with past medical history of obesity status post sleeve gastrectomy in March 2022, PCOS  Referred to rheumatology for evaluation of Raynaud's  Patient reports she has had cold intolerance for several years in her digits and sometimes her toes turn purple /bluish  She has these symptoms for many years. Recently seen by endocrine who referred her to rheumatologist.    She has had intermittent sciatica on right since since her pregnancy in 2016.  Has morning stiffness 45- 60 min in her hands, toes  Labs 12/2023 CBC, CMP, celiac disease antibodies, TSH, antithyroid antibodies   Patient does not have signs of underlying connective tissue diseases (CTDs) including inflammatory joint pain, malar rash, photosensitivity, sicca symptoms,  Exam without synovitis, rashes, skin thickening There is low suspicion for underlying CTD, however, given Raynaud symptoms will evaluate further with antibodies   - serologies as below - advised to stay warm, wear layers. Patient would like conservative approach    Total time spent in review of patient history, clinical exam, management, counseling, and plan of care:  50  min

## 2024-02-06 NOTE — PHYSICAL EXAM
[TextEntry] :   GENERAL: Appears in no acute distress HEENT: EOMI. No conjunctival erythema. Moist mucous membranes. No nasopharyngeal ulcers NECK: Supple, no cervical lymphadenopathy CARDIOVASCULAR: RRR. S1, S2 auscultated.  PULMONARY: Clear to auscultation b/l,  MSK: No active synovitis, swelling, erythema, or warmth. No joint tenderness to palpation. No deformities. Normal ROM of neck, back, b/l upper and lower extremities. Normal nail fold capilloroscobpy; capillaries hyper proliferation  SKIN: No lesions or rashes No sclerodactyly, telangiectasias, calcinosis. NEURO: No focal deficits. Motor strength 5/5 in major muscle groups of b/l UE and LE. Sensation to soft touch intact in major dermatomes of b/l UE and LE. PSYCH: Normal affect and thought process.

## 2024-02-06 NOTE — HISTORY OF PRESENT ILLNESS
[FreeTextEntry1] :  43-year-old female with past medical history of obesity status post sleeve gastrectomy in March 2022, PCOS   Referred to rheumatology for evaluation of Raynaud's  Patient reports she has had cold intolerance for several years in her digits and sometimes her toes turn purple /bluish  She has these symptoms for many years. Recently seen by endocrine who referred her to rheumatologist.    She has had intermittent sciatica on right since since her pregnancy in 2016.  Has morning stiffness 45- 60 min in her hands, toes  Patient denies joint pains, joint swelling, joint erythema/warmth, morning stiffness,    fatigue, fever, , weight loss, nasopharyngeal ulcers, chest pain, abdominal pain, cough, SOB, nausea, vomiting, diarrhea, , blood in stool, dysuria, hematuria, rash, photosensitivity, alopecia, dry eyes, dry mouth, eye pain/redness,myalgias, muscle weakness, dysphagia, numbness/tingling, miscarriages, Hx of DVT/PEs. Denies skin thickening,  Occasional heartburn with spicy food/ heavy food   Labs 12/2023 CBC, CMP, celiac disease antibodies, TSH, antithyroid antibodies        PMHx: As above PSHx: C section, ectopic surgery. gastroc sleeve Family Hx: Denies family history of rheumatologic conditions including RA, SLE, Sjogren's, Myositis, scleroderma, or vasculitis Social Hx:  Smoking Hx: denies EtOH Hx: rare Drug use: denies Occupation: [employer]  domestic partnership, 1 child

## 2024-02-08 LAB
CRP SERPL-MCNC: <3 MG/L
ERYTHROCYTE [SEDIMENTATION RATE] IN BLOOD BY WESTERGREN METHOD: 12 MM/HR

## 2024-02-09 LAB
C3 SERPL-MCNC: 92 MG/DL
C4 SERPL-MCNC: 21 MG/DL
RHEUMATOID FACT SER QL: <10 IU/ML

## 2024-02-10 LAB
DSDNA AB SER-ACNC: <12 IU/ML
THYROGLOB AB SERPL-ACNC: <20 IU/ML
THYROPEROXIDASE AB SERPL IA-ACNC: <10 IU/ML

## 2024-02-11 LAB
ENA RNP AB SER IA-ACNC: <0.2 AL
ENA SM AB SER IA-ACNC: <0.2 AL
ENA SS-A AB SER IA-ACNC: <0.2 AL
ENA SS-B AB SER IA-ACNC: <0.2 AL

## 2024-02-12 LAB
ANA PAT FLD IF-IMP: NORMAL
ANA SER IF-ACNC: ABNORMAL
CCP AB SER IA-ACNC: <8 UNITS
RF+CCP IGG SER-IMP: NEGATIVE

## 2024-02-15 LAB — 14-3-3 ETA AG SER IA-MCNC: <0.2 NG/ML

## 2024-02-20 LAB
CENP-A: <11 SI
CENP-B: <11 SI
FIBRILLARIN: <11 SI
PM/SCL-100: <11 SI
PM/SCL-75: <11 SI
RP11: <11 SI
RP155: <11 SI
SCL-70: <11 SI
TH/TO: <11 SI
U1-SNRNP RNP A: <11 SI
U1-SNRNP RNP C: <11 SI
U1-SNRNP RNP-70KD: <11 SI

## 2024-03-19 ENCOUNTER — APPOINTMENT (OUTPATIENT)
Dept: BARIATRICS | Facility: CLINIC | Age: 44
End: 2024-03-19
Payer: COMMERCIAL

## 2024-03-19 VITALS
OXYGEN SATURATION: 97 % | BODY MASS INDEX: 27.7 KG/M2 | DIASTOLIC BLOOD PRESSURE: 80 MMHG | SYSTOLIC BLOOD PRESSURE: 116 MMHG | HEART RATE: 51 BPM | WEIGHT: 156.31 LBS | HEIGHT: 63 IN | TEMPERATURE: 96.8 F

## 2024-03-19 DIAGNOSIS — Z82.61 FAMILY HISTORY OF ARTHRITIS: ICD-10-CM

## 2024-03-19 DIAGNOSIS — Z84.0 FAMILY HISTORY OF DISEASES OF THE SKIN AND SUBCUTANEOUS TISSUE: ICD-10-CM

## 2024-03-19 PROCEDURE — 99214 OFFICE O/P EST MOD 30 MIN: CPT

## 2024-03-19 RX ORDER — DOCUSATE CALCIUM 240 MG
CAPSULE ORAL DAILY
Refills: 0 | Status: ACTIVE | COMMUNITY

## 2024-03-19 NOTE — HISTORY OF PRESENT ILLNESS
[Procedure: ___] : Procedure performed: [unfilled]  [Date of Surgery: ___] : Date of Surgery:   [unfilled] [Surgeon Name:   ___] : Surgeon Name: Dr. DE LOS SANTOS [Pre-Op Weight ___] : Pre-op weight was [unfilled] lbs [de-identified] : 43-year-old woman 2 years s/p Laparoscopic Sleeve Gastrectomy with hiatal hernia repair (last seen 6 months ago) presents for follow-up. 8 lb weight gain since last visit.  Patient does weigh herself at home and noticed slight weight gain recently which may be in part due to menses.  Reports no abdominal pain, nausea/vomiting, constipation (controlled with stool softener), diarrhea, reflux/heartburn. Patient eating 3 protein-rich meals/day, drinking adequate zero-calorie fluids/day, taking bariatric vitamins, and exercising (uses a walking pad).  Last nutritionist appt was 4/25/23

## 2024-03-19 NOTE — ASSESSMENT
[FreeTextEntry1] : 43-year-old woman 2 years s/p Laparoscopic Sleeve Gastrectomy with hiatal hernia repair (last seen 6 months ago) presents for follow-up. 8 lb weight gain since last visit. Nutrition and exercise guidelines reviewed with the patient.   Last labs (3/9/24) reviewed with the pt: wnl   Continue 3 protein-focused meals/day (aim for 60 g - 70 g protein/day); try to choose lean meats like chicken, turkey, and fish instead of beef; try to limit cheese Encourage zero calorie fluid intake (64 oz/day) Continue bariatric vitamins Continue MiraLAX or Colace prn for constipation Increase exercise with cardio (aim for 8k-10k steps/day) and strength training 2-3x/week Use step counter Weigh yourself 1x-2x/week Try the Calm frandy or Soothing Pod frandy for stress management Follow-up in 3 months All questions answered   Call with any questions or concerns   Time before and after visit spent reviewing chart

## 2024-03-19 NOTE — PHYSICAL EXAM
[Normal] : affect appropriate [de-identified] : soft, NT, ND, no evidence of hernia or diastasis, incisions well-healed

## 2024-05-29 LAB
25(OH)D3 SERPL-MCNC: 58.7 NG/ML
ALBUMIN SERPL ELPH-MCNC: 4.2 G/DL
ALP BLD-CCNC: 47 U/L
ALT SERPL-CCNC: 15 U/L
ANION GAP SERPL CALC-SCNC: 9 MMOL/L
AST SERPL-CCNC: 16 U/L
BASOPHILS # BLD AUTO: 0.04 K/UL
BASOPHILS NFR BLD AUTO: 0.7 %
BILIRUB SERPL-MCNC: 0.8 MG/DL
BUN SERPL-MCNC: 11 MG/DL
CALCIUM SERPL-MCNC: 9.2 MG/DL
CHLORIDE SERPL-SCNC: 103 MMOL/L
CO2 SERPL-SCNC: 28 MMOL/L
CREAT SERPL-MCNC: 0.62 MG/DL
EGFR: 114 ML/MIN/1.73M2
EOSINOPHIL # BLD AUTO: 0.08 K/UL
EOSINOPHIL NFR BLD AUTO: 1.4 %
ESTIMATED AVERAGE GLUCOSE: 97 MG/DL
FOLATE SERPL-MCNC: >20 NG/ML
GLUCOSE SERPL-MCNC: 92 MG/DL
HBA1C MFR BLD HPLC: 5 %
HCT VFR BLD CALC: 44 %
HGB BLD-MCNC: 14.7 G/DL
IMM GRANULOCYTES NFR BLD AUTO: 0.2 %
IRON SERPL-MCNC: 160 UG/DL
LYMPHOCYTES # BLD AUTO: 2.32 K/UL
LYMPHOCYTES NFR BLD AUTO: 41 %
MAN DIFF?: NORMAL
MCHC RBC-ENTMCNC: 30.2 PG
MCHC RBC-ENTMCNC: 33.4 GM/DL
MCV RBC AUTO: 90.3 FL
MONOCYTES # BLD AUTO: 0.54 K/UL
MONOCYTES NFR BLD AUTO: 9.5 %
NEUTROPHILS # BLD AUTO: 2.67 K/UL
NEUTROPHILS NFR BLD AUTO: 47.2 %
PLATELET # BLD AUTO: 228 K/UL
POTASSIUM SERPL-SCNC: 4 MMOL/L
PROT SERPL-MCNC: 6.8 G/DL
RBC # BLD: 4.87 M/UL
RBC # FLD: 12 %
SODIUM SERPL-SCNC: 140 MMOL/L
VIT A SERPL-MCNC: 32.2 UG/DL
VIT B1 SERPL-MCNC: 121.8 NMOL/L
VIT B12 SERPL-MCNC: 1330 PG/ML
WBC # FLD AUTO: 5.66 K/UL
ZINC SERPL-MCNC: 86 UG/DL

## 2024-06-25 ENCOUNTER — APPOINTMENT (OUTPATIENT)
Dept: BARIATRICS | Facility: CLINIC | Age: 44
End: 2024-06-25
Payer: COMMERCIAL

## 2024-06-25 VITALS
WEIGHT: 153 LBS | DIASTOLIC BLOOD PRESSURE: 88 MMHG | TEMPERATURE: 98.1 F | HEART RATE: 95 BPM | HEIGHT: 63 IN | OXYGEN SATURATION: 99 % | BODY MASS INDEX: 27.11 KG/M2 | SYSTOLIC BLOOD PRESSURE: 130 MMHG

## 2024-06-25 DIAGNOSIS — Z98.84 BARIATRIC SURGERY STATUS: ICD-10-CM

## 2024-06-25 DIAGNOSIS — R63.4 ABNORMAL WEIGHT LOSS: ICD-10-CM

## 2024-06-25 DIAGNOSIS — Z98.890 OTHER SPECIFIED POSTPROCEDURAL STATES: ICD-10-CM

## 2024-06-25 DIAGNOSIS — E66.3 OVERWEIGHT: ICD-10-CM

## 2024-06-25 DIAGNOSIS — Z87.19 OTHER SPECIFIED POSTPROCEDURAL STATES: ICD-10-CM

## 2024-06-25 PROCEDURE — 99214 OFFICE O/P EST MOD 30 MIN: CPT

## 2024-06-25 PROCEDURE — G2211 COMPLEX E/M VISIT ADD ON: CPT | Mod: NC

## 2024-06-26 ENCOUNTER — APPOINTMENT (OUTPATIENT)
Dept: ENDOCRINOLOGY | Facility: CLINIC | Age: 44
End: 2024-06-26

## 2024-09-16 ENCOUNTER — NON-APPOINTMENT (OUTPATIENT)
Age: 44
End: 2024-09-16

## 2024-09-17 ENCOUNTER — APPOINTMENT (OUTPATIENT)
Dept: BARIATRICS | Facility: CLINIC | Age: 44
End: 2024-09-17
Payer: COMMERCIAL

## 2024-09-17 VITALS
WEIGHT: 154.98 LBS | HEART RATE: 74 BPM | SYSTOLIC BLOOD PRESSURE: 109 MMHG | HEIGHT: 63 IN | DIASTOLIC BLOOD PRESSURE: 77 MMHG | TEMPERATURE: 98.3 F | BODY MASS INDEX: 27.46 KG/M2 | OXYGEN SATURATION: 97 %

## 2024-09-17 DIAGNOSIS — Z98.890 OTHER SPECIFIED POSTPROCEDURAL STATES: ICD-10-CM

## 2024-09-17 DIAGNOSIS — R79.9 ABNORMAL FINDING OF BLOOD CHEMISTRY, UNSPECIFIED: ICD-10-CM

## 2024-09-17 DIAGNOSIS — Z87.19 OTHER SPECIFIED POSTPROCEDURAL STATES: ICD-10-CM

## 2024-09-17 DIAGNOSIS — Z86.39 PERSONAL HISTORY OF OTHER ENDOCRINE, NUTRITIONAL AND METABOLIC DISEASE: ICD-10-CM

## 2024-09-17 DIAGNOSIS — Z00.00 ENCOUNTER FOR GENERAL ADULT MEDICAL EXAMINATION W/OUT ABNORMAL FINDINGS: ICD-10-CM

## 2024-09-17 DIAGNOSIS — Z90.3 POSTSURGICAL MALABSORPTION, NOT ELSEWHERE CLASSIFIED: ICD-10-CM

## 2024-09-17 DIAGNOSIS — E56.9 VITAMIN DEFICIENCY, UNSPECIFIED: ICD-10-CM

## 2024-09-17 DIAGNOSIS — Z98.84 BARIATRIC SURGERY STATUS: ICD-10-CM

## 2024-09-17 DIAGNOSIS — E46 UNSPECIFIED PROTEIN-CALORIE MALNUTRITION: ICD-10-CM

## 2024-09-17 DIAGNOSIS — R63.2 POLYPHAGIA: ICD-10-CM

## 2024-09-17 DIAGNOSIS — R63.5 ABNORMAL WEIGHT GAIN: ICD-10-CM

## 2024-09-17 DIAGNOSIS — K91.2 POSTSURGICAL MALABSORPTION, NOT ELSEWHERE CLASSIFIED: ICD-10-CM

## 2024-09-17 DIAGNOSIS — Z01.812 ENCOUNTER FOR PREPROCEDURAL LABORATORY EXAMINATION: ICD-10-CM

## 2024-09-17 DIAGNOSIS — K59.00 CONSTIPATION, UNSPECIFIED: ICD-10-CM

## 2024-09-17 DIAGNOSIS — E61.8 DEFICIENCY OF OTHER SPECIFIED NUTRIENT ELEMENTS: ICD-10-CM

## 2024-09-17 DIAGNOSIS — R63.8 OTHER SYMPTOMS AND SIGNS CONCERNING FOOD AND FLUID INTAKE: ICD-10-CM

## 2024-09-17 DIAGNOSIS — E66.3 OVERWEIGHT: ICD-10-CM

## 2024-09-17 PROCEDURE — 99214 OFFICE O/P EST MOD 30 MIN: CPT

## 2024-09-17 NOTE — REVIEW OF SYSTEMS
[Abdominal Pain] : no abdominal pain [Vomiting] : no vomiting [Diarrhea] : no diarrhea [Reflux/Heartburn] : no reflux/ heartburn [Hernia] : no hernia

## 2024-09-17 NOTE — ASSESSMENT
[FreeTextEntry1] : 44-year-old woman 2.5 years s/p Laparoscopic Sleeve Gastrectomy with hiatal hernia repair presents for follow-up. Weight stable since last visit ~3 months ago. Reports constipation (pt has a Hx of chronic constipation); reports no abdominal pain, nausea/vomiting, diarrhea, reflux/heartburn, dysphagia. Pt schedule an appt with GI Dr Deanne Christianson for 9/30/24 to evaluate chronic constipation. Pt has tried all OTC medications without any significant relief.  Last labs (3/2024) reviewed with the pt: wnl. Next blood work due 3/2025   Continue 3 protein-focused meals/day (aim for 60 g - 70 g protein/day) Encourage zero calorie fluid intake (64 oz/day) Continue bariatric vitamins without iron due to constipation Exercise with cardio (aim for 8k-10k steps/day) and try to start strength training 2-3x/week Use step counter Weigh yourself 1x-2x/week Try the Calm frandy or Soothing Pod frandy for stress management Follow up with nutrition classes. Stop calcium chews for now due to constipation and follow up with GI  Follow-up in 6 months or sooner if needed. Check fasting blood work prior to appt.  All questions answered   Call with any questions or concerns   Time before and after visit spent reviewing chart

## 2024-09-17 NOTE — PHYSICAL EXAM
[de-identified] : Equal chest rise, non-labored respirations, no audible wheezing. [de-identified] : soft, NT, ND, no evidence of hernia or diastasis, incisions well-healed  [de-identified] : BRENT

## 2024-09-17 NOTE — HISTORY OF PRESENT ILLNESS
[de-identified] : 44-year-old woman 2.5 years s/p Laparoscopic Sleeve Gastrectomy with hiatal hernia repair presents for follow-up. Weight stable since last visit ~3 months ago. Reports constipation (pt has a Hx of chronic constipation); reports no abdominal pain, nausea/vomiting, diarrhea, reflux/heartburn, dysphagia. Pt schedule an appt with GI Dr Deanne Christianson for 9/30/24 to evaluate chronic constipation. Pt has tried all OTC medications without any significant relief.   Patient eating 3 protein-rich meals/day (B: egg whites and slice of [employer] bread, L: lean protein with vegetables and complex carbohydrate, D:  lean protein with vegetables and complex carbohydrate; snacks on protein shake if feels has not gotten enough protein, drinking adequate zero-calorie fluids/day (and one cup of decaf coffee), taking bariatric MVI without iron, and is trying to exercise by walking 4 days per week and strength training 2 days per week.   Last Nutrition appt was 4/25/23

## 2025-03-11 ENCOUNTER — NON-APPOINTMENT (OUTPATIENT)
Age: 45
End: 2025-03-11

## 2025-03-11 ENCOUNTER — APPOINTMENT (OUTPATIENT)
Dept: BARIATRICS | Facility: CLINIC | Age: 45
End: 2025-03-11
Payer: COMMERCIAL

## 2025-03-11 VITALS
TEMPERATURE: 98.6 F | BODY MASS INDEX: 28.24 KG/M2 | SYSTOLIC BLOOD PRESSURE: 119 MMHG | HEIGHT: 63 IN | HEART RATE: 93 BPM | DIASTOLIC BLOOD PRESSURE: 81 MMHG | WEIGHT: 159.39 LBS | OXYGEN SATURATION: 99 %

## 2025-03-11 DIAGNOSIS — E66.3 OVERWEIGHT: ICD-10-CM

## 2025-03-11 PROCEDURE — 99214 OFFICE O/P EST MOD 30 MIN: CPT

## 2025-03-12 LAB
25(OH)D3 SERPL-MCNC: 42 NG/ML
ALBUMIN SERPL ELPH-MCNC: 4.3 G/DL
ALP BLD-CCNC: 54 U/L
ALT SERPL-CCNC: 11 U/L
ANION GAP SERPL CALC-SCNC: 10 MMOL/L
AST SERPL-CCNC: 14 U/L
BASOPHILS # BLD AUTO: 0.04 K/UL
BASOPHILS NFR BLD AUTO: 0.7 %
BILIRUB SERPL-MCNC: 0.6 MG/DL
BUN SERPL-MCNC: 11 MG/DL
CALCIUM SERPL-MCNC: 9.3 MG/DL
CHLORIDE SERPL-SCNC: 105 MMOL/L
CO2 SERPL-SCNC: 24 MMOL/L
CREAT SERPL-MCNC: 0.64 MG/DL
EGFRCR SERPLBLD CKD-EPI 2021: 112 ML/MIN/1.73M2
EOSINOPHIL # BLD AUTO: 0.23 K/UL
EOSINOPHIL NFR BLD AUTO: 3.9 %
ESTIMATED AVERAGE GLUCOSE: 94 MG/DL
FOLATE SERPL-MCNC: 7.5 NG/ML
GLUCOSE SERPL-MCNC: 86 MG/DL
HBA1C MFR BLD HPLC: 4.9 %
HCT VFR BLD CALC: 44.8 %
HGB BLD-MCNC: 15.3 G/DL
IMM GRANULOCYTES NFR BLD AUTO: 0.3 %
IRON SERPL-MCNC: 115 UG/DL
LYMPHOCYTES # BLD AUTO: 2.52 K/UL
LYMPHOCYTES NFR BLD AUTO: 42.6 %
MAN DIFF?: NORMAL
MCHC RBC-ENTMCNC: 30.6 PG
MCHC RBC-ENTMCNC: 34.2 G/DL
MCV RBC AUTO: 89.6 FL
MONOCYTES # BLD AUTO: 0.61 K/UL
MONOCYTES NFR BLD AUTO: 10.3 %
NEUTROPHILS # BLD AUTO: 2.49 K/UL
NEUTROPHILS NFR BLD AUTO: 42.2 %
PLATELET # BLD AUTO: 225 K/UL
POTASSIUM SERPL-SCNC: 4.1 MMOL/L
PREALB SERPL NEPH-MCNC: 18 MG/DL
PROT SERPL-MCNC: 6.8 G/DL
RBC # BLD: 5 M/UL
RBC # FLD: 12.6 %
SODIUM SERPL-SCNC: 139 MMOL/L
VIT B1 SERPL-MCNC: 95.4 NMOL/L
VIT B12 SERPL-MCNC: 660 PG/ML
WBC # FLD AUTO: 5.91 K/UL

## 2025-03-12 RX ORDER — TENAPANOR HYDROCHLORIDE 53.2 MG/1
TABLET ORAL DAILY
Refills: 0 | Status: ACTIVE | COMMUNITY

## 2025-03-15 LAB — VIT A SERPL-MCNC: 34.7 UG/DL

## 2025-05-27 ENCOUNTER — APPOINTMENT (OUTPATIENT)
Dept: BARIATRICS | Facility: CLINIC | Age: 45
End: 2025-05-27
Payer: COMMERCIAL

## 2025-05-27 VITALS — HEIGHT: 63 IN | WEIGHT: 155 LBS | BODY MASS INDEX: 27.46 KG/M2

## 2025-05-27 DIAGNOSIS — R63.4 ABNORMAL WEIGHT LOSS: ICD-10-CM

## 2025-05-27 DIAGNOSIS — Z98.84 BARIATRIC SURGERY STATUS: ICD-10-CM

## 2025-05-27 DIAGNOSIS — E66.3 OVERWEIGHT: ICD-10-CM

## 2025-05-27 DIAGNOSIS — K58.9 IRRITABLE BOWEL SYNDROME, UNSPECIFIED: ICD-10-CM

## 2025-05-27 PROCEDURE — 99214 OFFICE O/P EST MOD 30 MIN: CPT | Mod: 95

## 2025-05-27 PROCEDURE — G2211 COMPLEX E/M VISIT ADD ON: CPT | Mod: NC,95

## 2025-05-28 PROBLEM — K58.9 IBS (IRRITABLE BOWEL SYNDROME): Status: ACTIVE | Noted: 2025-05-28

## 2025-08-07 ENCOUNTER — APPOINTMENT (OUTPATIENT)
Dept: BARIATRICS | Facility: CLINIC | Age: 45
End: 2025-08-07
Payer: COMMERCIAL

## 2025-08-07 VITALS — HEIGHT: 63 IN | BODY MASS INDEX: 26.75 KG/M2 | WEIGHT: 151 LBS

## 2025-08-07 DIAGNOSIS — Z98.84 BARIATRIC SURGERY STATUS: ICD-10-CM

## 2025-08-07 DIAGNOSIS — E66.3 OVERWEIGHT: ICD-10-CM

## 2025-08-07 DIAGNOSIS — R63.4 ABNORMAL WEIGHT LOSS: ICD-10-CM

## 2025-08-07 PROCEDURE — 99214 OFFICE O/P EST MOD 30 MIN: CPT | Mod: 95

## (undated) DEVICE — WRAP COMPRESSION CALF MED

## (undated) DEVICE — STAPLER COVIDIEN ENDO GIA XL HANDLE

## (undated) DEVICE — APPL DUPLOTIP LAP RIGID DRIP 40CM

## (undated) DEVICE — SYR LUER LOK 50CC

## (undated) DEVICE — SUCTION YANKAUER NO CONTROL VENT

## (undated) DEVICE — D HELP - CLEARVIEW CLEARIFY SYSTEM

## (undated) DEVICE — BLADE SAFETY LOCK #15

## (undated) DEVICE — CATH ELECHMSTAT  INJ 7FR 210CM

## (undated) DEVICE — TROCAR ETHICON ENDOPATH XCEL BLADELESS 5MM-100MM

## (undated) DEVICE — DRAPE TOWEL BLUE 17" X 24"

## (undated) DEVICE — SUT SOFSILK 0 30" V-20

## (undated) DEVICE — SHEARS HARMONIC ACE 5MM X 36CM CURVED TIP

## (undated) DEVICE — ENDOCATCH II 15MM

## (undated) DEVICE — TUBING STRYKER PNEUMOSURE HI FLOW RTP

## (undated) DEVICE — TROCAR COVIDIEN ENDO CLOSE SUTURING DEVICE

## (undated) DEVICE — BLANKET WARMER UPPER ADULT

## (undated) DEVICE — TROCAR ETHICON XCEL UNIVERSAL SLEEVE W OPTIVIEW 5MM-100MM

## (undated) DEVICE — ENDO SHEARS COVIDIEN 5MM W UNIPOLAR CAUTERY

## (undated) DEVICE — LIGASURE MARYLAND JAW LAPAROSCOPIC SEALER 37CM

## (undated) DEVICE — TROCAR ETHICON 15MM XCEL BLADELESS

## (undated) DEVICE — SOL IRR BAG NS 0.9% 3000ML

## (undated) DEVICE — SYR LUER LOK 20CC

## (undated) DEVICE — ELCTR EDGE BOVIE INSULATED BLADE TIP 2.75"

## (undated) DEVICE — TUBING STRYKEFLOW II SUCTION / IRRIGATOR

## (undated) DEVICE — SUT POLYSORB 3-0 30" V-20

## (undated) DEVICE — PACK GENERAL LAPAROSCOPY

## (undated) DEVICE — FOLEY TRAY 14FR 5CC LTX BAG CLOSED

## (undated) DEVICE — DRAPE 3/4 SHEET 52X76"

## (undated) DEVICE — TUBING SUCTION CONN 6FT STERILE

## (undated) DEVICE — ENDO SHEARS COVIDIEN 5MM LONG W MONOPOLAR CAUTERY

## (undated) DEVICE — PROBE FIAPC DIA 2.3MM/7FR LNTH 220CM/7.2FT

## (undated) DEVICE — TROCAR COVIDIEN VISIPORT PLUS 5-12MM

## (undated) DEVICE — SUT MAXON 4-0 30" P-12

## (undated) DEVICE — SYR LUER LOK 5CC

## (undated) DEVICE — SYR LUER LOK 10CC

## (undated) DEVICE — TROCAR ETHICON 12MM XCEL BLADELESS

## (undated) DEVICE — SUT POLYSORB 0 30" GU-46

## (undated) DEVICE — GLV 6.5 PROTEXIS

## (undated) DEVICE — TUBING PLUME AWAY 4.0

## (undated) DEVICE — FOLEY HOLDER STATLOCK 2 WAY ADULT

## (undated) DEVICE — NDL HYPO REGULAR BEVEL 22G X 1.5"